# Patient Record
Sex: FEMALE | Race: WHITE | NOT HISPANIC OR LATINO | URBAN - METROPOLITAN AREA
[De-identification: names, ages, dates, MRNs, and addresses within clinical notes are randomized per-mention and may not be internally consistent; named-entity substitution may affect disease eponyms.]

---

## 2022-05-10 ENCOUNTER — EMERGENCY (EMERGENCY)
Facility: HOSPITAL | Age: 37
LOS: 1 days | Discharge: HOME | End: 2022-05-10
Attending: STUDENT IN AN ORGANIZED HEALTH CARE EDUCATION/TRAINING PROGRAM
Payer: COMMERCIAL

## 2022-05-10 VITALS
SYSTOLIC BLOOD PRESSURE: 127 MMHG | HEIGHT: 62 IN | DIASTOLIC BLOOD PRESSURE: 91 MMHG | TEMPERATURE: 99 F | HEART RATE: 99 BPM | RESPIRATION RATE: 18 BRPM | WEIGHT: 244.05 LBS | OXYGEN SATURATION: 96 %

## 2022-05-10 DIAGNOSIS — Z98.89 OTHER SPECIFIED POSTPROCEDURAL STATES: Chronic | ICD-10-CM

## 2022-05-10 LAB
ALBUMIN SERPL ELPH-MCNC: 3.7 G/DL — SIGNIFICANT CHANGE UP (ref 3.3–5.2)
ALP SERPL-CCNC: 81 U/L — SIGNIFICANT CHANGE UP (ref 40–120)
ALT FLD-CCNC: 37 U/L — HIGH
ANION GAP SERPL CALC-SCNC: 13 MMOL/L — SIGNIFICANT CHANGE UP (ref 5–17)
AST SERPL-CCNC: 25 U/L — SIGNIFICANT CHANGE UP
BASOPHILS # BLD AUTO: 0.04 K/UL — SIGNIFICANT CHANGE UP (ref 0–0.2)
BASOPHILS NFR BLD AUTO: 0.3 % — SIGNIFICANT CHANGE UP (ref 0–2)
BILIRUB SERPL-MCNC: 0.4 MG/DL — SIGNIFICANT CHANGE UP (ref 0.4–2)
BLD GP AB SCN SERPL QL: SIGNIFICANT CHANGE UP
BUN SERPL-MCNC: 8.5 MG/DL — SIGNIFICANT CHANGE UP (ref 8–20)
CALCIUM SERPL-MCNC: 8.8 MG/DL — SIGNIFICANT CHANGE UP (ref 8.6–10.2)
CHLORIDE SERPL-SCNC: 99 MMOL/L — SIGNIFICANT CHANGE UP (ref 98–107)
CO2 SERPL-SCNC: 22 MMOL/L — SIGNIFICANT CHANGE UP (ref 22–29)
CREAT SERPL-MCNC: 0.63 MG/DL — SIGNIFICANT CHANGE UP (ref 0.5–1.3)
EGFR: 119 ML/MIN/1.73M2 — SIGNIFICANT CHANGE UP
EOSINOPHIL # BLD AUTO: 0.09 K/UL — SIGNIFICANT CHANGE UP (ref 0–0.5)
EOSINOPHIL NFR BLD AUTO: 0.7 % — SIGNIFICANT CHANGE UP (ref 0–6)
GLUCOSE SERPL-MCNC: 104 MG/DL — HIGH (ref 70–99)
HCG SERPL-ACNC: 772.8 MIU/ML — HIGH
HCT VFR BLD CALC: 41.8 % — SIGNIFICANT CHANGE UP (ref 34.5–45)
HGB BLD-MCNC: 14 G/DL — SIGNIFICANT CHANGE UP (ref 11.5–15.5)
IMM GRANULOCYTES NFR BLD AUTO: 0.2 % — SIGNIFICANT CHANGE UP (ref 0–1.5)
LYMPHOCYTES # BLD AUTO: 3.89 K/UL — HIGH (ref 1–3.3)
LYMPHOCYTES # BLD AUTO: 30.5 % — SIGNIFICANT CHANGE UP (ref 13–44)
MCHC RBC-ENTMCNC: 30 PG — SIGNIFICANT CHANGE UP (ref 27–34)
MCHC RBC-ENTMCNC: 33.5 GM/DL — SIGNIFICANT CHANGE UP (ref 32–36)
MCV RBC AUTO: 89.5 FL — SIGNIFICANT CHANGE UP (ref 80–100)
MONOCYTES # BLD AUTO: 0.81 K/UL — SIGNIFICANT CHANGE UP (ref 0–0.9)
MONOCYTES NFR BLD AUTO: 6.4 % — SIGNIFICANT CHANGE UP (ref 2–14)
NEUTROPHILS # BLD AUTO: 7.89 K/UL — HIGH (ref 1.8–7.4)
NEUTROPHILS NFR BLD AUTO: 61.9 % — SIGNIFICANT CHANGE UP (ref 43–77)
PLATELET # BLD AUTO: 260 K/UL — SIGNIFICANT CHANGE UP (ref 150–400)
POTASSIUM SERPL-MCNC: 3.8 MMOL/L — SIGNIFICANT CHANGE UP (ref 3.5–5.3)
POTASSIUM SERPL-SCNC: 3.8 MMOL/L — SIGNIFICANT CHANGE UP (ref 3.5–5.3)
PROT SERPL-MCNC: 7.6 G/DL — SIGNIFICANT CHANGE UP (ref 6.6–8.7)
RBC # BLD: 4.67 M/UL — SIGNIFICANT CHANGE UP (ref 3.8–5.2)
RBC # FLD: 12.3 % — SIGNIFICANT CHANGE UP (ref 10.3–14.5)
SODIUM SERPL-SCNC: 134 MMOL/L — LOW (ref 135–145)
WBC # BLD: 12.74 K/UL — HIGH (ref 3.8–10.5)
WBC # FLD AUTO: 12.74 K/UL — HIGH (ref 3.8–10.5)

## 2022-05-10 PROCEDURE — 99284 EMERGENCY DEPT VISIT MOD MDM: CPT | Mod: 25

## 2022-05-10 PROCEDURE — 85025 COMPLETE CBC W/AUTO DIFF WBC: CPT

## 2022-05-10 PROCEDURE — 86850 RBC ANTIBODY SCREEN: CPT

## 2022-05-10 PROCEDURE — 80053 COMPREHEN METABOLIC PANEL: CPT

## 2022-05-10 PROCEDURE — 99285 EMERGENCY DEPT VISIT HI MDM: CPT

## 2022-05-10 PROCEDURE — 36415 COLL VENOUS BLD VENIPUNCTURE: CPT

## 2022-05-10 PROCEDURE — 76817 TRANSVAGINAL US OBSTETRIC: CPT

## 2022-05-10 PROCEDURE — 76801 OB US < 14 WKS SINGLE FETUS: CPT | Mod: 26

## 2022-05-10 PROCEDURE — 86901 BLOOD TYPING SEROLOGIC RH(D): CPT

## 2022-05-10 PROCEDURE — 76817 TRANSVAGINAL US OBSTETRIC: CPT | Mod: 26

## 2022-05-10 PROCEDURE — 76801 OB US < 14 WKS SINGLE FETUS: CPT

## 2022-05-10 PROCEDURE — 84702 CHORIONIC GONADOTROPIN TEST: CPT

## 2022-05-10 PROCEDURE — 86900 BLOOD TYPING SEROLOGIC ABO: CPT

## 2022-05-10 RX ORDER — ACETAMINOPHEN 500 MG
650 TABLET ORAL ONCE
Refills: 0 | Status: COMPLETED | OUTPATIENT
Start: 2022-05-10 | End: 2022-05-10

## 2022-05-10 RX ADMIN — Medication 650 MILLIGRAM(S): at 22:05

## 2022-05-10 NOTE — ED PROVIDER NOTE - OBJECTIVE STATEMENT
pt is a 35 y/o female  currently 6 weeks pregnant presenting to the ed for evaluation. pt was sent in by [facility]gyn [facility] for abdominal pain. pt states has hcg levels that are decreasing, abdominal pain since last night. pt denies vaginal bleeding. pt states had ultrasound in the office today and they were unable to visualize the left ovary. pt denies cp sob fever nausea vomiting headache neck pain vaginal discharge or bleeding dysuria hematuria

## 2022-05-10 NOTE — ED PROVIDER NOTE - PATIENT PORTAL LINK FT
You can access the FollowMyHealth Patient Portal offered by [facility] by registering at the following website: [email or web address]. By joining [facility]’s FollowMyHealth portal, you will also be able to view your health information using other applications (apps) compatible with our system.

## 2022-05-10 NOTE — ED PROVIDER NOTE - NS ED ATTENDING STATEMENT MOD
This was a shared visit with the SHABBIR. I reviewed and verified the documentation and independently performed the documented:

## 2022-05-10 NOTE — ED PROVIDER NOTE - NSFOLLOWUPINSTRUCTIONS_ED_ALL_ED_FT
[facility]                                                                                                                                                                                                          Abdominal Pain During Pregnancy      Abdominal pain is common during pregnancy and has many possible causes. Some causes are more serious than others, and sometimes the cause is not known.    Abdominal pain can be a sign that labor is starting. It can also be caused by normal growth of your baby causing stretching of muscles and ligaments during pregnancy. Always tell your health care provider if you have any abdominal pain.      Follow these instructions at home:     • Do not have sex or put anything in your vagina until your pain goes away completely.      •Get plenty of rest until your pain improves.      •Drink enough fluid to keep your urine pale yellow.      •Take over-the-counter and prescription medicines only as told by your health care provider.      •Keep all follow-up visits. This is important.        Contact a health care provider if:    •Your pain continues or gets worse after resting.    •You have lower abdominal pain that:  •Comes and goes at regular intervals.      •Spreads to your back.      •Is similar to menstrual cramps.        •You have pain or burning when you urinate.        Get help right away if:    •You have a fever, chills, or shortness of breath.      •You have vaginal bleeding.      •You are leaking fluid or passing tissue from your vagina.      •You have vomiting or diarrhea that lasts for more than 24 hours.      •Your baby is moving less than usual.      •You feel very weak or faint.      •You develop severe pain in your upper abdomen.        Summary    •Abdominal pain is common during pregnancy and has many possible causes.      •If you experience abdominal pain during pregnancy, tell your health care provider right away.      •Follow your health care provider's home care instructions and keep all follow-up visits as told.      This information is not intended to replace advice given to you by your health care provider. Make sure you discuss any questions you have with your health care provider.      Document Revised: 08/31/2021 Document Reviewed: 08/31/2021    Elsevier Patient Education © 2022 [employer]

## 2022-05-10 NOTE — ED ADULT TRIAGE NOTE - CHIEF COMPLAINT QUOTE
Ambulatory  sent in by OBGYN to r/o ectopic pregnancy. Patient presents with LLQ abdominal pain without vaginal bleeding/discharge with decreasing serial HCG's. Pain started last night and has been worsening. Has not medicated for pain.

## 2022-05-23 ENCOUNTER — APPOINTMENT (OUTPATIENT)
Dept: OBGYN | Facility: CLINIC | Age: 37
End: 2022-05-23

## 2022-07-18 ENCOUNTER — OUTPATIENT (OUTPATIENT)
Dept: OUTPATIENT SERVICES | Facility: HOSPITAL | Age: 37
LOS: 1 days | End: 2022-07-18
Payer: COMMERCIAL

## 2022-07-18 VITALS
HEART RATE: 100 BPM | DIASTOLIC BLOOD PRESSURE: 89 MMHG | SYSTOLIC BLOOD PRESSURE: 131 MMHG | HEIGHT: 62 IN | OXYGEN SATURATION: 99 % | WEIGHT: 238.98 LBS | TEMPERATURE: 98 F | RESPIRATION RATE: 17 BRPM

## 2022-07-18 DIAGNOSIS — Z98.89 OTHER SPECIFIED POSTPROCEDURAL STATES: Chronic | ICD-10-CM

## 2022-07-18 DIAGNOSIS — N85.00 ENDOMETRIAL HYPERPLASIA, UNSPECIFIED: ICD-10-CM

## 2022-07-18 DIAGNOSIS — Z01.818 ENCOUNTER FOR OTHER PREPROCEDURAL EXAMINATION: ICD-10-CM

## 2022-07-18 LAB
ALBUMIN SERPL ELPH-MCNC: 3.4 G/DL — SIGNIFICANT CHANGE UP (ref 3.3–5)
ALP SERPL-CCNC: 96 U/L — SIGNIFICANT CHANGE UP (ref 40–120)
ALT FLD-CCNC: 35 U/L — SIGNIFICANT CHANGE UP (ref 12–78)
ANION GAP SERPL CALC-SCNC: 4 MMOL/L — LOW (ref 5–17)
AST SERPL-CCNC: 18 U/L — SIGNIFICANT CHANGE UP (ref 15–37)
BILIRUB SERPL-MCNC: 0.4 MG/DL — SIGNIFICANT CHANGE UP (ref 0.2–1.2)
BUN SERPL-MCNC: 13 MG/DL — SIGNIFICANT CHANGE UP (ref 7–23)
CALCIUM SERPL-MCNC: 9.2 MG/DL — SIGNIFICANT CHANGE UP (ref 8.5–10.1)
CHLORIDE SERPL-SCNC: 105 MMOL/L — SIGNIFICANT CHANGE UP (ref 96–108)
CO2 SERPL-SCNC: 29 MMOL/L — SIGNIFICANT CHANGE UP (ref 22–31)
CREAT SERPL-MCNC: 0.88 MG/DL — SIGNIFICANT CHANGE UP (ref 0.5–1.3)
EGFR: 88 ML/MIN/1.73M2 — SIGNIFICANT CHANGE UP
GLUCOSE SERPL-MCNC: 96 MG/DL — SIGNIFICANT CHANGE UP (ref 70–99)
HCG SERPL-ACNC: <1 MIU/ML — SIGNIFICANT CHANGE UP
HCT VFR BLD CALC: 42.2 % — SIGNIFICANT CHANGE UP (ref 34.5–45)
HGB BLD-MCNC: 14.2 G/DL — SIGNIFICANT CHANGE UP (ref 11.5–15.5)
MCHC RBC-ENTMCNC: 29.8 PG — SIGNIFICANT CHANGE UP (ref 27–34)
MCHC RBC-ENTMCNC: 33.6 GM/DL — SIGNIFICANT CHANGE UP (ref 32–36)
MCV RBC AUTO: 88.7 FL — SIGNIFICANT CHANGE UP (ref 80–100)
NRBC # BLD: 0 /100 WBCS — SIGNIFICANT CHANGE UP (ref 0–0)
PLATELET # BLD AUTO: 262 K/UL — SIGNIFICANT CHANGE UP (ref 150–400)
POTASSIUM SERPL-MCNC: 4.4 MMOL/L — SIGNIFICANT CHANGE UP (ref 3.5–5.3)
POTASSIUM SERPL-SCNC: 4.4 MMOL/L — SIGNIFICANT CHANGE UP (ref 3.5–5.3)
PROT SERPL-MCNC: 7.9 G/DL — SIGNIFICANT CHANGE UP (ref 6–8.3)
RBC # BLD: 4.76 M/UL — SIGNIFICANT CHANGE UP (ref 3.8–5.2)
RBC # FLD: 12.1 % — SIGNIFICANT CHANGE UP (ref 10.3–14.5)
SARS-COV-2 RNA SPEC QL NAA+PROBE: SIGNIFICANT CHANGE UP
SODIUM SERPL-SCNC: 138 MMOL/L — SIGNIFICANT CHANGE UP (ref 135–145)
WBC # BLD: 10.72 K/UL — HIGH (ref 3.8–10.5)
WBC # FLD AUTO: 10.72 K/UL — HIGH (ref 3.8–10.5)

## 2022-07-18 PROCEDURE — 36415 COLL VENOUS BLD VENIPUNCTURE: CPT

## 2022-07-18 PROCEDURE — U0005: CPT

## 2022-07-18 PROCEDURE — U0003: CPT

## 2022-07-18 PROCEDURE — 93005 ELECTROCARDIOGRAM TRACING: CPT

## 2022-07-18 PROCEDURE — 86901 BLOOD TYPING SEROLOGIC RH(D): CPT

## 2022-07-18 PROCEDURE — 93010 ELECTROCARDIOGRAM REPORT: CPT

## 2022-07-18 PROCEDURE — 86900 BLOOD TYPING SEROLOGIC ABO: CPT

## 2022-07-18 PROCEDURE — 84702 CHORIONIC GONADOTROPIN TEST: CPT

## 2022-07-18 PROCEDURE — 85027 COMPLETE CBC AUTOMATED: CPT

## 2022-07-18 PROCEDURE — 80053 COMPREHEN METABOLIC PANEL: CPT

## 2022-07-18 PROCEDURE — 86850 RBC ANTIBODY SCREEN: CPT

## 2022-07-18 PROCEDURE — G0463: CPT

## 2022-07-18 NOTE — H&P PST ADULT - ASSESSMENT
This 35 yo f with a pMHX PCOS, , thyroid disease, hx of benign brain tumor  presents to PST for a scheduled D&C hysteroscopy with Myosure  with Dr Landers  on 7/21/22 .

## 2022-07-18 NOTE — H&P PST ADULT - HISTORY OF PRESENT ILLNESS
This 37 yo f with a pMHX PCOS, , thyroid disease, hx of benign brain tumor  presents to PST for a scheduled D&C hysteroscopy with Myosure  with Dr Landers  on 7/21/22 . Pt is electing to have this procedure.  This 37 yo f with a pMHX PCOS, , thyroid disease, hx of benign brain tumor  presents to PST for a scheduled D&C hysteroscopy with Myosure  with Dr Landers  on 7/21/22 . Pt is electing to have this procedure. Pt had a miscarriage in April. She has had consistent menstrual bleeding with cramps since. She does not go through more than on pad per day. She denies any chest pain, palpitations, SOB,

## 2022-07-18 NOTE — H&P PST ADULT - NS PRO FEM REPRO BREAST EXAM FREQ
Case Management Discharge Note      Final Note: Per [facility] can accept pt to skilled rehab/LTC with on site hospice services. Pt's sister (Claudette Reza, [phone]) updated. Jelani w/c carlo arranged at 5:00 PM. Packet provided to RNDarcy Paz LCSW         Selected Continued Care - Admitted Since 2/15/2022     Destination Coordination complete.    Service Provider Selected Services Address Phone Fax Patient Preferred    [facility] [address] [id number] [phone] --          Durable Medical Equipment    No services have been selected for the patient.              Dialysis/Infusion    No services have been selected for the patient.              Home Medical Care    No services have been selected for the patient.              Therapy    No services have been selected for the patient.              Community Resources    No services have been selected for the patient.              Community & DME    No services have been selected for the patient.                  Transportation Services  [facility]    Final Discharge Disposition Code: 03 - skilled nursing facility (SNF)   monthly

## 2022-07-18 NOTE — H&P PST ADULT - NSICDXFAMILYHX_GEN_ALL_CORE_FT
FAMILY HISTORY:  Mother  Still living? Unknown  FH: diabetes mellitus, Age at diagnosis: Age Unknown  FH: thyroid disease, Age at diagnosis: Age Unknown    Aunt  Still living? No  FH: breast cancer, Age at diagnosis: Age Unknown

## 2022-07-18 NOTE — H&P PST ADULT - PROBLEM SELECTOR PLAN 1
PST: CBC, HCG, T&S   No medical evaluation needed  All preoperative instructions including CHD cleanse reviewed with patient who verbalized understanding.   Avoid all NSAID/ASA containing products as well as all herbal/vitamin supplements. Tylenol only for pain/headache.   Covid swab at [place] date TBD. Pt verbalized understanding. Fully vaccinated; Denies recent travel, recent illness and sick contact with COVID in the last 3 weeks

## 2022-07-20 ENCOUNTER — TRANSCRIPTION ENCOUNTER (OUTPATIENT)
Age: 37
End: 2022-07-20

## 2022-07-20 NOTE — ASU PATIENT PROFILE, ADULT - DOES PATIENT HAVE ADVANCE DIRECTIVE
Detail Level: Detailed Quality 110: Preventive Care And Screening: Influenza Immunization: Influenza Immunization Administered during Influenza season Quality 431: Preventive Care And Screening: Unhealthy Alcohol Use - Screening: Patient screened for unhealthy alcohol use using a single question and scores less than 2 times per year Quality 111:Pneumonia Vaccination Status For Older Adults: Pneumococcal Vaccination Previously Received Quality 226: Preventive Care And Screening: Tobacco Use: Screening And Cessation Intervention: Patient screened for tobacco use and is an ex/non-smoker Quality 130: Documentation Of Current Medications In The Medical Record: Current Medications Documented No

## 2022-07-20 NOTE — ASU PATIENT PROFILE, ADULT - NS PRO PT RIGHT SUPPORT PERSON
TO BE COMPLETED BY CENTRAL AUTH TEAM:     Physician: DR Gonzalo Ojeda    Medication:  SYNVISC-ONE     Number of Injections in Series (Appointments scheduled 1 week apart from one another):  1    Schedule after this date: 12/5/18    Billing Info: Buy and Bill/Specialty Pharmacy-Patient Supply BUY & BILL     Appointment Message Line:   RIGHT KNEE SYNVISC-ONE ([address])    (please copy and paste appointment message line when scheduling appointment)    Additional Comments: LEFT MSG same name as above

## 2022-07-20 NOTE — ASU PATIENT PROFILE, ADULT - NSICDXPASTMEDICALHX_GEN_ALL_CORE_FT
PAST MEDICAL HISTORY:  Brain tumor benign    Depression     Seizure last seizure over 15 yrs ago.

## 2022-07-21 ENCOUNTER — OUTPATIENT (OUTPATIENT)
Dept: OUTPATIENT SERVICES | Facility: HOSPITAL | Age: 37
LOS: 1 days | End: 2022-07-21
Payer: COMMERCIAL

## 2022-07-21 ENCOUNTER — TRANSCRIPTION ENCOUNTER (OUTPATIENT)
Age: 37
End: 2022-07-21

## 2022-07-21 ENCOUNTER — RESULT REVIEW (OUTPATIENT)
Age: 37
End: 2022-07-21

## 2022-07-21 VITALS
HEART RATE: 83 BPM | OXYGEN SATURATION: 97 % | SYSTOLIC BLOOD PRESSURE: 124 MMHG | DIASTOLIC BLOOD PRESSURE: 80 MMHG | TEMPERATURE: 98 F | HEIGHT: 62 IN | WEIGHT: 238.98 LBS | RESPIRATION RATE: 14 BRPM

## 2022-07-21 VITALS
DIASTOLIC BLOOD PRESSURE: 69 MMHG | OXYGEN SATURATION: 100 % | SYSTOLIC BLOOD PRESSURE: 119 MMHG | RESPIRATION RATE: 13 BRPM

## 2022-07-21 DIAGNOSIS — N85.00 ENDOMETRIAL HYPERPLASIA, UNSPECIFIED: ICD-10-CM

## 2022-07-21 DIAGNOSIS — Z98.89 OTHER SPECIFIED POSTPROCEDURAL STATES: Chronic | ICD-10-CM

## 2022-07-21 DIAGNOSIS — Z01.818 ENCOUNTER FOR OTHER PREPROCEDURAL EXAMINATION: ICD-10-CM

## 2022-07-21 PROCEDURE — 88305 TISSUE EXAM BY PATHOLOGIST: CPT

## 2022-07-21 PROCEDURE — 58558 HYSTEROSCOPY BIOPSY: CPT

## 2022-07-21 PROCEDURE — 88305 TISSUE EXAM BY PATHOLOGIST: CPT | Mod: 26

## 2022-07-21 DEVICE — MYOSURE TISSUE REMOVAL DEVICE REACH: Type: IMPLANTABLE DEVICE | Status: FUNCTIONAL

## 2022-07-21 DEVICE — MYOSURE TISSUE REMOVAL DEVICE LITE: Type: IMPLANTABLE DEVICE | Status: FUNCTIONAL

## 2022-07-21 DEVICE — MYOSURE TISSUE REMOVAL FMS FOR FLUENT XL: Type: IMPLANTABLE DEVICE | Status: FUNCTIONAL

## 2022-07-21 RX ORDER — SODIUM CHLORIDE 9 MG/ML
1000 INJECTION, SOLUTION INTRAVENOUS
Refills: 0 | Status: DISCONTINUED | OUTPATIENT
Start: 2022-07-21 | End: 2022-07-21

## 2022-07-21 RX ORDER — CHOLECALCIFEROL (VITAMIN D3) 125 MCG
0 CAPSULE ORAL
Qty: 0 | Refills: 0 | DISCHARGE

## 2022-07-21 RX ORDER — METOCLOPRAMIDE HCL 10 MG
5 TABLET ORAL ONCE
Refills: 0 | Status: DISCONTINUED | OUTPATIENT
Start: 2022-07-21 | End: 2022-07-21

## 2022-07-21 RX ORDER — OXYCODONE HYDROCHLORIDE 5 MG/1
5 TABLET ORAL ONCE
Refills: 0 | Status: DISCONTINUED | OUTPATIENT
Start: 2022-07-21 | End: 2022-07-21

## 2022-07-21 RX ORDER — HYDROMORPHONE HYDROCHLORIDE 2 MG/ML
0.5 INJECTION INTRAMUSCULAR; INTRAVENOUS; SUBCUTANEOUS
Refills: 0 | Status: DISCONTINUED | OUTPATIENT
Start: 2022-07-21 | End: 2022-07-21

## 2022-07-21 RX ORDER — METFORMIN HYDROCHLORIDE 850 MG/1
0 TABLET ORAL
Qty: 0 | Refills: 0 | DISCHARGE

## 2022-07-21 RX ADMIN — SODIUM CHLORIDE 60 MILLILITER(S): 9 INJECTION, SOLUTION INTRAVENOUS at 08:34

## 2022-07-21 NOTE — BRIEF OPERATIVE NOTE - OPERATION/FINDINGS
anteverted uterus, normal size; cervix L/C/P  uterus sounding to 3 cm  no adnexal masses, vulva and vagina wnl  the endometrium was smooth and regular, no polyps or fibroids were noted. anteverted uterus, normal size; cervix L/C/P  uterus sounding to 3 cm  no adnexal masses, vulva and vagina wnl  the endometrium was smooth and regular, no polyps or fibroids were noted.  job # [id number]

## 2022-07-21 NOTE — ASU DISCHARGE PLAN (ADULT/PEDIATRIC) - CARE PROVIDER_API CALL
Elvie Landers)  Obstetrics and Gynecology  [address] [place] [address]  Phone: [phone]  Fax: [phone]  Established Patient  Follow Up Time: 1 week

## 2022-07-22 LAB — SURGICAL PATHOLOGY STUDY: SIGNIFICANT CHANGE UP

## 2023-03-20 ENCOUNTER — ASOB RESULT (OUTPATIENT)
Age: 38
End: 2023-03-20

## 2023-03-20 ENCOUNTER — APPOINTMENT (OUTPATIENT)
Dept: ANTEPARTUM | Facility: CLINIC | Age: 38
End: 2023-03-20
Payer: COMMERCIAL

## 2023-03-20 DIAGNOSIS — O35.1XX0 MATERNAL CARE FOR (SUSPECTED) CHROMOSOMAL ABNORMALITY IN FETUS, NOT APPLICABLE OR UNSPECIFIED: ICD-10-CM

## 2023-03-20 DIAGNOSIS — N91.2 AMENORRHEA, UNSPECIFIED: ICD-10-CM

## 2023-03-20 PROCEDURE — 36415 COLL VENOUS BLD VENIPUNCTURE: CPT

## 2023-03-20 PROCEDURE — 76813 OB US NUCHAL MEAS 1 GEST: CPT

## 2023-03-29 LAB
ADDITIONAL US: NORMAL
COMMENTS: AFP: NORMAL
CRL SCAN TWIN B: NORMAL
CRL SCAN: NORMAL
CROWN RUMP LENGTH TWIN B: NORMAL
CROWN RUMP LENGTH: 64.3 MM
DOWN SYNDROME AGE RISK: NORMAL
DOWN SYNDROME INTERPRETATION: NORMAL
DOWN SYNDROME SCREENING RISK: NORMAL
GEST. AGE ON COLLECTION DATE: 12.6 WEEKS
HCG MOM: 0.81
HCG VALUE: 59.3 IU/ML
MATERNAL AGE AT EDD: 36.8 YR
NOTE: AFP: NORMAL
NT MOM TWIN B: NORMAL
NT TWIN B: NORMAL
NUCHAL TRANSLUCENCY (NT): 2.1 MM
NUCHAL TRANSLUCENCY MOM: 1.27
NUMBER OF FETUSES: 1
PAPP-A MOM: 1.08
PAPP-A VALUE: 625.8 NG/ML
RACE: NORMAL
RESULTS AFP: NORMAL
SONOGRAPHER ID#: NORMAL
SUBMIT PART 2 SAMPLE USING: NORMAL
TEST RESULTS: AFP: NORMAL
TRISOMY 18 AGE RISK: NORMAL
TRISOMY 18 INTERPRETATION: NORMAL
TRISOMY 18 SCREENING RISK: NORMAL
WEIGHT AFP: 241 LBS

## 2023-04-07 NOTE — HISTORY OF PRESENT ILLNESS
[FreeTextEntry1] : Ms. CARBALLO  is a 37 year old  female  who presents for initial endocrine evaluation. She presents with regard to a history of . She presents via the kind courtesy of   . \par \par  Additional medical history includes that of seizure disorder (first seizure at age 9and was dx with left parietal tumor s/p resection)\par surgical hx : craniotomy grid implantation July 2008 and another resection May 2009 for residual mass and scar tissue \par

## 2023-04-12 ENCOUNTER — APPOINTMENT (OUTPATIENT)
Dept: ENDOCRINOLOGY | Facility: CLINIC | Age: 38
End: 2023-04-12

## 2023-05-22 ENCOUNTER — APPOINTMENT (OUTPATIENT)
Dept: ANTEPARTUM | Facility: CLINIC | Age: 38
End: 2023-05-22
Payer: COMMERCIAL

## 2023-05-22 ENCOUNTER — ASOB RESULT (OUTPATIENT)
Age: 38
End: 2023-05-22

## 2023-05-22 PROCEDURE — 76811 OB US DETAILED SNGL FETUS: CPT

## 2023-05-30 ENCOUNTER — ASOB RESULT (OUTPATIENT)
Age: 38
End: 2023-05-30

## 2023-05-30 ENCOUNTER — APPOINTMENT (OUTPATIENT)
Dept: ANTEPARTUM | Facility: CLINIC | Age: 38
End: 2023-05-30
Payer: COMMERCIAL

## 2023-05-30 PROCEDURE — 76816 OB US FOLLOW-UP PER FETUS: CPT

## 2023-08-21 ENCOUNTER — TRANSCRIPTION ENCOUNTER (OUTPATIENT)
Age: 38
End: 2023-08-21

## 2023-08-21 ENCOUNTER — INPATIENT (INPATIENT)
Facility: HOSPITAL | Age: 38
LOS: 3 days | Discharge: HOME | End: 2023-08-25
Payer: COMMERCIAL

## 2023-08-21 VITALS — SYSTOLIC BLOOD PRESSURE: 142 MMHG | HEART RATE: 90 BPM | OXYGEN SATURATION: 100 % | DIASTOLIC BLOOD PRESSURE: 93 MMHG

## 2023-08-21 DIAGNOSIS — O26.899 OTHER SPECIFIED PREGNANCY RELATED CONDITIONS, UNSPECIFIED TRIMESTER: ICD-10-CM

## 2023-08-21 DIAGNOSIS — Z98.89 OTHER SPECIFIED POSTPROCEDURAL STATES: Chronic | ICD-10-CM

## 2023-08-21 DIAGNOSIS — Z34.80 ENCOUNTER FOR SUPERVISION OF OTHER NORMAL PREGNANCY, UNSPECIFIED TRIMESTER: ICD-10-CM

## 2023-08-21 LAB
ALBUMIN SERPL ELPH-MCNC: 2.9 G/DL — LOW (ref 3.3–5)
ALP SERPL-CCNC: 220 U/L — HIGH (ref 40–120)
ALT FLD-CCNC: 20 U/L — SIGNIFICANT CHANGE UP (ref 10–45)
ANION GAP SERPL CALC-SCNC: 11 MMOL/L — SIGNIFICANT CHANGE UP (ref 5–17)
APTT BLD: 32.8 SEC — SIGNIFICANT CHANGE UP (ref 24.5–35.6)
AST SERPL-CCNC: 17 U/L — SIGNIFICANT CHANGE UP (ref 10–40)
BASOPHILS # BLD AUTO: 0.04 K/UL — SIGNIFICANT CHANGE UP (ref 0–0.2)
BASOPHILS NFR BLD AUTO: 0.3 % — SIGNIFICANT CHANGE UP (ref 0–2)
BILIRUB SERPL-MCNC: 0.2 MG/DL — SIGNIFICANT CHANGE UP (ref 0.2–1.2)
BLD GP AB SCN SERPL QL: NEGATIVE — SIGNIFICANT CHANGE UP
BUN SERPL-MCNC: 12 MG/DL — SIGNIFICANT CHANGE UP (ref 7–23)
CALCIUM SERPL-MCNC: 9.3 MG/DL — SIGNIFICANT CHANGE UP (ref 8.4–10.5)
CHLORIDE SERPL-SCNC: 104 MMOL/L — SIGNIFICANT CHANGE UP (ref 96–108)
CO2 SERPL-SCNC: 18 MMOL/L — LOW (ref 22–31)
CREAT SERPL-MCNC: 1 MG/DL — SIGNIFICANT CHANGE UP (ref 0.5–1.3)
EGFR: 75 ML/MIN/1.73M2 — SIGNIFICANT CHANGE UP
EOSINOPHIL # BLD AUTO: 0.12 K/UL — SIGNIFICANT CHANGE UP (ref 0–0.5)
EOSINOPHIL NFR BLD AUTO: 1 % — SIGNIFICANT CHANGE UP (ref 0–6)
FIBRINOGEN PPP-MCNC: 466 MG/DL — HIGH (ref 200–445)
GLUCOSE SERPL-MCNC: 131 MG/DL — HIGH (ref 70–99)
HCT VFR BLD CALC: 36.3 % — SIGNIFICANT CHANGE UP (ref 34.5–45)
HGB BLD-MCNC: 12.6 G/DL — SIGNIFICANT CHANGE UP (ref 11.5–15.5)
IMM GRANULOCYTES NFR BLD AUTO: 0.3 % — SIGNIFICANT CHANGE UP (ref 0–0.9)
INR BLD: 0.87 RATIO — SIGNIFICANT CHANGE UP (ref 0.85–1.18)
LDH SERPL L TO P-CCNC: 196 U/L — SIGNIFICANT CHANGE UP (ref 50–242)
LYMPHOCYTES # BLD AUTO: 2.95 K/UL — SIGNIFICANT CHANGE UP (ref 1–3.3)
LYMPHOCYTES # BLD AUTO: 25.2 % — SIGNIFICANT CHANGE UP (ref 13–44)
MCHC RBC-ENTMCNC: 30 PG — SIGNIFICANT CHANGE UP (ref 27–34)
MCHC RBC-ENTMCNC: 34.7 GM/DL — SIGNIFICANT CHANGE UP (ref 32–36)
MCV RBC AUTO: 86.4 FL — SIGNIFICANT CHANGE UP (ref 80–100)
MONOCYTES # BLD AUTO: 0.84 K/UL — SIGNIFICANT CHANGE UP (ref 0–0.9)
MONOCYTES NFR BLD AUTO: 7.2 % — SIGNIFICANT CHANGE UP (ref 2–14)
NEUTROPHILS # BLD AUTO: 7.72 K/UL — HIGH (ref 1.8–7.4)
NEUTROPHILS NFR BLD AUTO: 66 % — SIGNIFICANT CHANGE UP (ref 43–77)
NRBC # BLD: 0 /100 WBCS — SIGNIFICANT CHANGE UP (ref 0–0)
PLATELET # BLD AUTO: 171 K/UL — SIGNIFICANT CHANGE UP (ref 150–400)
POTASSIUM SERPL-MCNC: 4.1 MMOL/L — SIGNIFICANT CHANGE UP (ref 3.5–5.3)
POTASSIUM SERPL-SCNC: 4.1 MMOL/L — SIGNIFICANT CHANGE UP (ref 3.5–5.3)
PROT SERPL-MCNC: 6.1 G/DL — SIGNIFICANT CHANGE UP (ref 6–8.3)
PROTHROM AB SERPL-ACNC: 9.6 SEC — SIGNIFICANT CHANGE UP (ref 9.5–13)
RBC # BLD: 4.2 M/UL — SIGNIFICANT CHANGE UP (ref 3.8–5.2)
RBC # FLD: 12.7 % — SIGNIFICANT CHANGE UP (ref 10.3–14.5)
RH IG SCN BLD-IMP: POSITIVE — SIGNIFICANT CHANGE UP
RH IG SCN BLD-IMP: POSITIVE — SIGNIFICANT CHANGE UP
SODIUM SERPL-SCNC: 133 MMOL/L — LOW (ref 135–145)
URATE SERPL-MCNC: 6.8 MG/DL — SIGNIFICANT CHANGE UP (ref 2.5–7)
WBC # BLD: 11.71 K/UL — HIGH (ref 3.8–10.5)
WBC # FLD AUTO: 11.71 K/UL — HIGH (ref 3.8–10.5)

## 2023-08-21 RX ORDER — MAGNESIUM SULFATE 500 MG/ML
4 VIAL (ML) INJECTION ONCE
Refills: 0 | Status: COMPLETED | OUTPATIENT
Start: 2023-08-21 | End: 2023-08-21

## 2023-08-21 RX ORDER — MAGNESIUM SULFATE 500 MG/ML
2 VIAL (ML) INJECTION
Qty: 40 | Refills: 0 | Status: DISCONTINUED | OUTPATIENT
Start: 2023-08-21 | End: 2023-08-22

## 2023-08-21 RX ORDER — OXYTOCIN 10 UNIT/ML
333.33 VIAL (ML) INJECTION
Qty: 20 | Refills: 0 | Status: DISCONTINUED | OUTPATIENT
Start: 2023-08-21 | End: 2023-08-25

## 2023-08-21 RX ORDER — NIFEDIPINE 30 MG
30 TABLET, EXTENDED RELEASE 24 HR ORAL EVERY 24 HOURS
Refills: 0 | Status: DISCONTINUED | OUTPATIENT
Start: 2023-08-21 | End: 2023-08-24

## 2023-08-21 RX ORDER — NIFEDIPINE 30 MG
10 TABLET, EXTENDED RELEASE 24 HR ORAL ONCE
Refills: 0 | Status: COMPLETED | OUTPATIENT
Start: 2023-08-21 | End: 2023-08-21

## 2023-08-21 RX ORDER — SODIUM CHLORIDE 9 MG/ML
1000 INJECTION, SOLUTION INTRAVENOUS ONCE
Refills: 0 | Status: DISCONTINUED | OUTPATIENT
Start: 2023-08-21 | End: 2023-08-22

## 2023-08-21 RX ORDER — CITRIC ACID/SODIUM CITRATE 300-500 MG
30 SOLUTION, ORAL ORAL ONCE
Refills: 0 | Status: DISCONTINUED | OUTPATIENT
Start: 2023-08-21 | End: 2023-08-22

## 2023-08-21 RX ORDER — NIFEDIPINE 30 MG
30 TABLET, EXTENDED RELEASE 24 HR ORAL ONCE
Refills: 0 | Status: DISCONTINUED | OUTPATIENT
Start: 2023-08-21 | End: 2023-08-21

## 2023-08-21 RX ORDER — SODIUM CHLORIDE 9 MG/ML
1000 INJECTION, SOLUTION INTRAVENOUS
Refills: 0 | Status: DISCONTINUED | OUTPATIENT
Start: 2023-08-21 | End: 2023-08-22

## 2023-08-21 RX ORDER — FAMOTIDINE 10 MG/ML
20 INJECTION INTRAVENOUS ONCE
Refills: 0 | Status: DISCONTINUED | OUTPATIENT
Start: 2023-08-21 | End: 2023-08-22

## 2023-08-21 RX ADMIN — Medication 10 MILLIGRAM(S): at 23:56

## 2023-08-21 RX ADMIN — Medication 12 MILLIGRAM(S): at 23:12

## 2023-08-21 RX ADMIN — Medication 300 GRAM(S): at 23:40

## 2023-08-21 RX ADMIN — Medication 30 MILLIGRAM(S): at 23:52

## 2023-08-21 NOTE — OB PROVIDER H&P - HISTORY OF PRESENT ILLNESS
R3 H&P    Pt is a 36y/o  at 34+1 admitted for sPEC as well as decreased FM. In triage patient was noted to have multiple severe range blood pressures requiring a push of procardia. Patient was noted to have a nonreactive NST. Patient complained of HA and decreased FM. Denied LOF, VB, Cx.  Prenatal cours c/b gHTN one month ago  EFW 3000    OBHx: SAB x1 s/p D+C  GynHx: Denies cysts, fibroids, abnromal paps, STIs  PMHx: pregestational, prediabetes  PSHx: SAB x1, Brain surgery x4  Med: Brain Tumor  All: NKDA  SH: denies toxic habits x3

## 2023-08-21 NOTE — OB PROVIDER H&P - ASSESSMENT
A&P: 37 year old  @ 34+1 presents with decreased FM and elevated in BP. In triage patient found to have multiple severe range blood pressures meeting criteria for pre-eclampsia with severe features. Of ntoe patient noted to be transverse  Labor: admit to L&D  -procardia 10 IR, procardia 30xL  -BPP 6/10  -routine labs  -EFM/toco  -NPO, IV hydration  Fetal: cat 1 tracing, fetal status reassuring  GBS: neg  Analgesia: Spinal    discussed w Dr Anshul Morales PGY3

## 2023-08-21 NOTE — OB RN PATIENT PROFILE - FALL HARM RISK - UNIVERSAL INTERVENTIONS
Bed in lowest position, wheels locked, appropriate side rails in place/Call bell, personal items and telephone in reach/Instruct patient to call for assistance before getting out of bed or chair/Non-slip footwear when patient is out of bed/[place] to call system/Physically safe environment - no spills, clutter or unnecessary equipment/Purposeful Proactive Rounding/Room/bathroom lighting operational, light cord in reach

## 2023-08-21 NOTE — OB RN TRIAGE NOTE - FALL HARM RISK - UNIVERSAL INTERVENTIONS
Bed in lowest position, wheels locked, appropriate side rails in place/Call bell, personal items and telephone in reach/Instruct patient to call for assistance before getting out of bed or chair/Non-slip footwear when patient is out of bed/[place] to call system/Physically safe environment - no spills, clutter or unnecessary equipment/Purposeful Proactive Rounding/Room/bathroom lighting operational, light cord in reach

## 2023-08-21 NOTE — OB RN PATIENT PROFILE - PROVIDER NOTIFICATION
Order for orthoses and diabetic shoes completed.  I apologize for the delay.  Please notify Lucille on Monday and confirm she has the information for setting an appointment of with Orthotics.     Thank you.     Dr. Nolasco   Declines

## 2023-08-21 NOTE — OB RN PATIENT PROFILE - GRAVIDA, OB PROFILE
Detail Level: Generalized
Detail Level: Simple
2
Moisturizer Recommendations: Amlactin
Detail Level: Zone

## 2023-08-22 LAB
APPEARANCE UR: CLEAR — SIGNIFICANT CHANGE UP
BACTERIA # UR AUTO: NEGATIVE — SIGNIFICANT CHANGE UP
BILIRUB UR-MCNC: NEGATIVE — SIGNIFICANT CHANGE UP
COLOR SPEC: YELLOW — SIGNIFICANT CHANGE UP
COVID-19 SPIKE DOMAIN AB INTERP: POSITIVE
COVID-19 SPIKE DOMAIN ANTIBODY RESULT: >250 U/ML — HIGH
CREAT ?TM UR-MCNC: 64 MG/DL — SIGNIFICANT CHANGE UP
DIFF PNL FLD: NEGATIVE — SIGNIFICANT CHANGE UP
EPI CELLS # UR: 5 — SIGNIFICANT CHANGE UP
GLUCOSE UR QL: NEGATIVE — SIGNIFICANT CHANGE UP
HYALINE CASTS # UR AUTO: 0 /LPF — SIGNIFICANT CHANGE UP (ref 0–7)
KETONES UR-MCNC: NEGATIVE — SIGNIFICANT CHANGE UP
LEUKOCYTE ESTERASE UR-ACNC: NEGATIVE — SIGNIFICANT CHANGE UP
MAGNESIUM SERPL-MCNC: 4.9 MG/DL — HIGH (ref 1.6–2.6)
MAGNESIUM SERPL-MCNC: 6.2 MG/DL — HIGH (ref 1.6–2.6)
MAGNESIUM SERPL-MCNC: 7 MG/DL — HIGH (ref 1.6–2.6)
NITRITE UR-MCNC: NEGATIVE — SIGNIFICANT CHANGE UP
PH UR: 6 — SIGNIFICANT CHANGE UP (ref 5–8)
PROT ?TM UR-MCNC: 30 MG/DL — HIGH (ref 0–12)
PROT UR-MCNC: ABNORMAL
PROT/CREAT UR-RTO: 0.5 RATIO — HIGH (ref 0–0.2)
RBC CASTS # UR COMP ASSIST: 2 /HPF — SIGNIFICANT CHANGE UP (ref 0–4)
SARS-COV-2 IGG+IGM SERPL QL IA: >250 U/ML — HIGH
SARS-COV-2 IGG+IGM SERPL QL IA: POSITIVE
SP GR SPEC: 1.01 — SIGNIFICANT CHANGE UP (ref 1.01–1.02)
T PALLIDUM AB TITR SER: NEGATIVE — SIGNIFICANT CHANGE UP
UROBILINOGEN FLD QL: SIGNIFICANT CHANGE UP
WBC UR QL: 2 /HPF — SIGNIFICANT CHANGE UP (ref 0–5)

## 2023-08-22 PROCEDURE — 88307 TISSUE EXAM BY PATHOLOGIST: CPT | Mod: 26

## 2023-08-22 RX ORDER — OXYCODONE HYDROCHLORIDE 5 MG/1
5 TABLET ORAL ONCE
Refills: 0 | Status: DISCONTINUED | OUTPATIENT
Start: 2023-08-22 | End: 2023-08-25

## 2023-08-22 RX ORDER — SIMETHICONE 80 MG/1
80 TABLET, CHEWABLE ORAL EVERY 4 HOURS
Refills: 0 | Status: DISCONTINUED | OUTPATIENT
Start: 2023-08-22 | End: 2023-08-25

## 2023-08-22 RX ORDER — HEPARIN SODIUM 5000 [USP'U]/ML
5000 INJECTION INTRAVENOUS; SUBCUTANEOUS EVERY 12 HOURS
Refills: 0 | Status: DISCONTINUED | OUTPATIENT
Start: 2023-08-22 | End: 2023-08-25

## 2023-08-22 RX ORDER — MAGNESIUM HYDROXIDE 400 MG/1
30 TABLET, CHEWABLE ORAL
Refills: 0 | Status: DISCONTINUED | OUTPATIENT
Start: 2023-08-22 | End: 2023-08-25

## 2023-08-22 RX ORDER — TETANUS TOXOID, REDUCED DIPHTHERIA TOXOID AND ACELLULAR PERTUSSIS VACCINE, ADSORBED 5; 2.5; 8; 8; 2.5 [IU]/.5ML; [IU]/.5ML; UG/.5ML; UG/.5ML; UG/.5ML
0.5 SUSPENSION INTRAMUSCULAR ONCE
Refills: 0 | Status: DISCONTINUED | OUTPATIENT
Start: 2023-08-22 | End: 2023-08-25

## 2023-08-22 RX ORDER — OXYTOCIN 10 UNIT/ML
333.33 VIAL (ML) INJECTION
Qty: 20 | Refills: 0 | Status: DISCONTINUED | OUTPATIENT
Start: 2023-08-22 | End: 2023-08-25

## 2023-08-22 RX ORDER — DIPHENHYDRAMINE HCL 50 MG
25 CAPSULE ORAL EVERY 4 HOURS
Refills: 0 | Status: DISCONTINUED | OUTPATIENT
Start: 2023-08-22 | End: 2023-08-23

## 2023-08-22 RX ORDER — DEXAMETHASONE 0.5 MG/5ML
4 ELIXIR ORAL EVERY 6 HOURS
Refills: 0 | Status: DISCONTINUED | OUTPATIENT
Start: 2023-08-22 | End: 2023-08-23

## 2023-08-22 RX ORDER — DIPHENHYDRAMINE HCL 50 MG
25 CAPSULE ORAL EVERY 4 HOURS
Refills: 0 | Status: DISCONTINUED | OUTPATIENT
Start: 2023-08-22 | End: 2023-08-25

## 2023-08-22 RX ORDER — ONDANSETRON 8 MG/1
4 TABLET, FILM COATED ORAL EVERY 6 HOURS
Refills: 0 | Status: DISCONTINUED | OUTPATIENT
Start: 2023-08-22 | End: 2023-08-23

## 2023-08-22 RX ORDER — MAGNESIUM SULFATE 500 MG/ML
1.5 VIAL (ML) INJECTION
Qty: 40 | Refills: 0 | Status: DISCONTINUED | OUTPATIENT
Start: 2023-08-22 | End: 2023-08-23

## 2023-08-22 RX ORDER — DIPHENHYDRAMINE HCL 50 MG
25 CAPSULE ORAL EVERY 6 HOURS
Refills: 0 | Status: DISCONTINUED | OUTPATIENT
Start: 2023-08-22 | End: 2023-08-25

## 2023-08-22 RX ORDER — OXYCODONE HYDROCHLORIDE 5 MG/1
5 TABLET ORAL
Refills: 0 | Status: DISCONTINUED | OUTPATIENT
Start: 2023-08-22 | End: 2023-08-23

## 2023-08-22 RX ORDER — NALBUPHINE HYDROCHLORIDE 10 MG/ML
2.5 INJECTION, SOLUTION INTRAMUSCULAR; INTRAVENOUS; SUBCUTANEOUS EVERY 6 HOURS
Refills: 0 | Status: DISCONTINUED | OUTPATIENT
Start: 2023-08-22 | End: 2023-08-23

## 2023-08-22 RX ORDER — MORPHINE SULFATE 50 MG/1
0.1 CAPSULE, EXTENDED RELEASE ORAL ONCE
Refills: 0 | Status: DISCONTINUED | OUTPATIENT
Start: 2023-08-22 | End: 2023-08-23

## 2023-08-22 RX ORDER — NALOXONE HYDROCHLORIDE 4 MG/.1ML
0.1 SPRAY NASAL
Refills: 0 | Status: DISCONTINUED | OUTPATIENT
Start: 2023-08-22 | End: 2023-08-23

## 2023-08-22 RX ORDER — IBUPROFEN 200 MG
600 TABLET ORAL EVERY 6 HOURS
Refills: 0 | Status: COMPLETED | OUTPATIENT
Start: 2023-08-22 | End: 2024-07-20

## 2023-08-22 RX ORDER — OXYCODONE HYDROCHLORIDE 5 MG/1
5 TABLET ORAL
Refills: 0 | Status: COMPLETED | OUTPATIENT
Start: 2023-08-22 | End: 2023-08-29

## 2023-08-22 RX ORDER — LANOLIN
1 OINTMENT (GRAM) TOPICAL EVERY 6 HOURS
Refills: 0 | Status: DISCONTINUED | OUTPATIENT
Start: 2023-08-22 | End: 2023-08-25

## 2023-08-22 RX ORDER — KETOROLAC TROMETHAMINE 30 MG/ML
30 SYRINGE (ML) INJECTION EVERY 6 HOURS
Refills: 0 | Status: COMPLETED | OUTPATIENT
Start: 2023-08-22 | End: 2023-08-23

## 2023-08-22 RX ORDER — ACETAMINOPHEN 500 MG
975 TABLET ORAL
Refills: 0 | Status: DISCONTINUED | OUTPATIENT
Start: 2023-08-22 | End: 2023-08-25

## 2023-08-22 RX ORDER — ACETAMINOPHEN 500 MG
1000 TABLET ORAL ONCE
Refills: 0 | Status: COMPLETED | OUTPATIENT
Start: 2023-08-22 | End: 2023-08-22

## 2023-08-22 RX ORDER — SODIUM CHLORIDE 9 MG/ML
1000 INJECTION, SOLUTION INTRAVENOUS
Refills: 0 | Status: DISCONTINUED | OUTPATIENT
Start: 2023-08-22 | End: 2023-08-25

## 2023-08-22 RX ORDER — METOCLOPRAMIDE HCL 10 MG
10 TABLET ORAL ONCE
Refills: 0 | Status: COMPLETED | OUTPATIENT
Start: 2023-08-22 | End: 2023-08-22

## 2023-08-22 RX ADMIN — Medication 400 MILLIGRAM(S): at 07:37

## 2023-08-22 RX ADMIN — Medication 1000 MILLIGRAM(S): at 08:30

## 2023-08-22 RX ADMIN — Medication 975 MILLIGRAM(S): at 23:04

## 2023-08-22 RX ADMIN — Medication 30 MILLIGRAM(S): at 20:18

## 2023-08-22 RX ADMIN — HEPARIN SODIUM 5000 UNIT(S): 5000 INJECTION INTRAVENOUS; SUBCUTANEOUS at 17:17

## 2023-08-22 RX ADMIN — Medication 10 MILLIGRAM(S): at 23:50

## 2023-08-22 RX ADMIN — Medication 30 MILLIGRAM(S): at 14:43

## 2023-08-22 RX ADMIN — Medication 25 MILLIGRAM(S): at 23:51

## 2023-08-22 RX ADMIN — Medication 975 MILLIGRAM(S): at 17:17

## 2023-08-22 RX ADMIN — Medication 975 MILLIGRAM(S): at 12:59

## 2023-08-22 RX ADMIN — Medication 975 MILLIGRAM(S): at 11:55

## 2023-08-22 RX ADMIN — Medication 30 MILLIGRAM(S): at 23:02

## 2023-08-22 RX ADMIN — Medication 30 MILLIGRAM(S): at 21:18

## 2023-08-22 RX ADMIN — Medication 30 MILLIGRAM(S): at 15:15

## 2023-08-22 RX ADMIN — Medication 50 GM/HR: at 00:03

## 2023-08-22 NOTE — CONSULT NOTE ADULT - ASSESSMENT
Impression:  37-year-old woman with PMHx of brain tumor at age 26 which was resected by Dr. Burton at [facility]. At the age of 9 she developed seizures often characterized by right sided shaking for minutes. She has also experienced grand mal seizures. The tumor was identified at this age and followed until she was about 26 at which time she underwent surgical resection.  Seizures resolved at about the age of 27 - she reports one seizure following the surgery but otherwise she has been seizure free. Seizure medication was discontinued at about the age of 29 (was previously on keppra, lamictal and depakote).    Neurology consulted as anesthesia is requesting for [facility] for clearance for neuroaxial anesthesia.    Patient follows wit Dr. Cipriano Patino 1 year ago and reported she was stable.     Diagnosis:  history of seizures   history of brain tumor    Recommendations:  no contraindication to neuroaxial anesthesia  no contraindication to any necessary obstetrical care   discussed with patient  discussed with family at bedside

## 2023-08-22 NOTE — OB PROVIDER DELIVERY SUMMARY - NSCOUNTCORRECT_OBGYN_ALL_OB
Alvaro, can we look for a day that Lupis can go on my schedule in August and that Dr. Henley can staff (maybe at end of day)?  Laps, needles and instruments count was reported as correct.

## 2023-08-22 NOTE — CONSULT NOTE ADULT - ASSESSMENT
37y (1985) woman with PMH Benign Brain tumor unspecified s/ resection 2008, Hx seizure not on ASMs, DM2 presenting after not being able to feel her baby move. Per OB, patient will need urgent delivery. Neurology consulted as anesthesia is requesting for house neurology for clearance for neuroaxial anesthesia. Patient reports initally presetingw ith seizures in 2008, imaging hsoed she had brain tumor. After resection she had 1 additional seizure after resection. She was previously on keppra, lamictal and depakote, however has been tapered off these medications and has been seizure free for many years (does not know exact time). Denies recent seizures, weakness, numbness, changes to speech ,headache. Patient follows wit Dr. Cipriano Patino 1 year ago and reported she was stable.     Impression: Seizure disorder from brain tumor, now seizure free and not on any antiseizure medications     Recommendation:  [] No neurological contraindication to neuraxial anesthesia  [] Seizure precautions  [] Please note: if patient has a convulsion, please document length of episode, specifically what patient was doing paying attention to eye opening vs closure, gaze deviation, shaking of extremities, tongue bite, urinary incontinence, any derangement of vital signs. Generalized motor seizures can have dilated and unreactive pupils, absent oculocephalic reflexes (no dolls eyes), and open eyelids (99% of cases). Head turning from sided to side, pelvic thrusting, bicycling movements, hand waving are NOT manifestations of generalized motor seizures.    Discussed with Dr. Rinaldi attending.  37y (1985) woman with PMH Benign Brain tumor unspecified s/ resection 2008, Hx seizure not on ASMs, DM2 presenting after not being able to feel her baby move. Per OB, patient will need urgent delivery. Neurology consulted as anesthesia is requesting for house neurology for clearance for neuroaxial anesthesia. Patient reports initally presetingw ith seizures in 2008, imaging hsoed she had brain tumor. After resection she had 1 additional seizure after resection. She was previously on keppra, lamictal and depakote, however has been tapered off these medications and has been seizure free for many years (does not know exact time). Denies recent seizures, weakness, numbness, changes to speech ,headache. Patient follows wit Dr. Cipriano Patino 1 year ago and reported she was stable.     Impression: Seizure disorder from brain tumor s/p resection, now seizure free and not on any antiseizure medications     Recommendation:  [] No neurological contraindication to neuraxial anesthesia  [] Seizure precautions  [] Please note: if patient has a convulsion, please document length of episode, specifically what patient was doing paying attention to eye opening vs closure, gaze deviation, shaking of extremities, tongue bite, urinary incontinence, any derangement of vital signs. Generalized motor seizures can have dilated and unreactive pupils, absent oculocephalic reflexes (no dolls eyes), and open eyelids (99% of cases). Head turning from sided to side, pelvic thrusting, bicycling movements, hand waving are NOT manifestations of generalized motor seizures.    Discussed with Dr. Rinaldi attending.

## 2023-08-22 NOTE — CONSULT NOTE ADULT - SUBJECTIVE AND OBJECTIVE BOX
Neurology - Consult Note    -  Spectra: [phone] ([facility]), [phone] ([facility])  -    HPI: Patient TANIA CARBALLO is a 37y (1985) woman with PMH Benign Brain tumor unspecified s/ resection 2008, Hx seizure not on ASMs, DM2 presenting after not being able to feel her baby move. Per OB, patient will need urgent delivery. Neurology consulted as anesthesia is requesting for house neurology for clearance for neuroaxial anesthesia. Patient reports initally presetingw ith seizures in 2008, imaging hsoed she had brain tumor. After resection she had 1 additional seizure after resection. She was previously on keppra, lamictal and depakote, however has been tapered off these medications and has been seizure free for many years (does not know exact time). Denies recent seizures, weakness, numbness, changes to speech ,headache.     Patient follows wit Dr. Cipriano Patino 1 year ago and reported she was stable.       Review of Systems:  All other review of systems is negative unless indicated above.    Allergies:  No Known Allergies      PMHx/PSHx/Family Hx: As above, otherwise see below   Seizure    Depression    Brain tumor        Social Hx:  No current use of tobacco, alcohol, or illicit drugs      Medications:  MEDICATIONS  (STANDING):  citric acid/sodium citrate Solution 30 milliLiter(s) Oral once  famotidine Injectable 20 milliGRAM(s) IV Push once  lactated ringers Bolus 1000 milliLiter(s) IV Bolus once  lactated ringers. 1000 milliLiter(s) (125 mL/Hr) IV Continuous <Continuous>  magnesium sulfate Infusion 2 Gm/Hr (50 mL/Hr) IV Continuous <Continuous>  NIFEdipine XL 30 milliGRAM(s) Oral every 24 hours  oxytocin Infusion 333.333 milliUNIT(s)/Min (1000 mL/Hr) IV Continuous <Continuous>    MEDICATIONS  (PRN):      Vitals:  T(C): 36.8 (08-22-23 @ 00:18), Max: 36.8 (08-21-23 @ 22:28)  HR: 100 (08-22-23 @ 01:06) (77 - 100)  BP: 154/93 (08-22-23 @ 01:00) (139/85 - 162/94)  RR: 18 (08-22-23 @ 00:18) (18 - 18)  SpO2: 98% (08-22-23 @ 01:06) (98% - 100%)    Physical Examination:  General - NAD    Neurologic Exam:  Mental status - Awake, Alert, Oriented to person, place, and time. Speech fluent, repetition and naming intact. Follows simple and complex commands.    Cranial nerves - PERRL, VFF, EOMI, face sensation (V1-V3) intact b/l, facial strength intact without asymmetry b/l, hearing intact b/l, palate with symmetric elevation, trapezius  5/5 strength b/l, tongue midline on protrusion with full lateral movement    Motor - Normal bulk and tone throughout. No pronator drift.  Strength testing            Deltoid      Biceps      Triceps     Wrist Extension    Wrist Flexion        R            5                 5               5                     5                              5                        5                L             5                 5               5                     5                              5                        5                              Hip Flexion    Hip Extension     Dorsiflexion    Plantar Flexion  R              5                           5                       5                           5                           L              5                           5                        5                           5                             Sensation - Light touch/temperature intact throughout    DTR's -             Biceps      Triceps     Brachioradialis      Patellar    Ankle    Toes/plantar response  R             2+             2+                  2+                       2+            2+                 Down  L              2+             2+                 2+                        2+           2+                 Down    Coordination - Finger to Nose intact b/l. No tremors appreciated    Gait and station -Deffered     Labs:                        12.6   11.71 )-----------( 171      ( 21 Aug 2023 23:05 )             36.3     08-21    133<L>  |  104  |  12  ----------------------------<  131<H>  4.1   |  18<L>  |  1.00    Ca    9.3      21 Aug 2023 23:05    TPro  6.1  /  Alb  2.9<L>  /  TBili  0.2  /  DBili  x   /  AST  17  /  ALT  20  /  AlkPhos  220<H>  08-21    CAPILLARY BLOOD GLUCOSE        LIVER FUNCTIONS - ( 21 Aug 2023 23:05 )  Alb: 2.9 g/dL / Pro: 6.1 g/dL / ALK PHOS: 220 U/L / ALT: 20 U/L / AST: 17 U/L / GGT: x             PT/INR - ( 21 Aug 2023 23:05 )   PT: 9.6 sec;   INR: 0.87 ratio         PTT - ( 21 Aug 2023 23:05 )  PTT:32.8 sec            Radiology:     Neurology - Consult Note    -  Spectra: [phone] ([facility]), [phone] ([facility])  -    HPI: Patient TANIA CARBALLO is a 37y (1985) woman with PMH Benign Brain tumor unspecified s/ resection 2008, Hx seizure not on ASMs, DM2 presenting after not being able to feel her baby move. Per OB, patient will need urgent delivery. Neurology consulted as anesthesia is requesting for house neurology for clearance for neuroaxial anesthesia. Patient reports initally presetingw ith seizures in 2008, imaging hsoed she had brain tumor. After resection she had 1 additional seizure after resection. She was previously on keppra, lamictal and depakote, however has been tapered off these medications and has been seizure free for many years (does not know exact time). Denies recent seizures, weakness, numbness, changes to speech ,headache.     Patient follows wit Dr. Cipriano Patino 1 year ago and reported she was stable.       Review of Systems:  All other review of systems is negative unless indicated above.    Allergies:  No Known Allergies      PMHx/PSHx/Family Hx: As above, otherwise see below   Seizure    Depression    Brain tumor        Social Hx:  No current use of tobacco, alcohol, or illicit drugs      Medications:  MEDICATIONS  (STANDING):  citric acid/sodium citrate Solution 30 milliLiter(s) Oral once  famotidine Injectable 20 milliGRAM(s) IV Push once  lactated ringers Bolus 1000 milliLiter(s) IV Bolus once  lactated ringers. 1000 milliLiter(s) (125 mL/Hr) IV Continuous <Continuous>  magnesium sulfate Infusion 2 Gm/Hr (50 mL/Hr) IV Continuous <Continuous>  NIFEdipine XL 30 milliGRAM(s) Oral every 24 hours  oxytocin Infusion 333.333 milliUNIT(s)/Min (1000 mL/Hr) IV Continuous <Continuous>    MEDICATIONS  (PRN):      Vitals:  T(C): 36.8 (08-22-23 @ 00:18), Max: 36.8 (08-21-23 @ 22:28)  HR: 100 (08-22-23 @ 01:06) (77 - 100)  BP: 154/93 (08-22-23 @ 01:00) (139/85 - 162/94)  RR: 18 (08-22-23 @ 00:18) (18 - 18)  SpO2: 98% (08-22-23 @ 01:06) (98% - 100%)    Physical Examination:  General - NAD    Neurologic Exam:  Mental status - Awake, Alert, Oriented to person, place, and time. Speech clear, fluent. Follows simple and complex commands.    Cranial nerves - PERRL, VFF, EOMI, face sensation (V1-V3) intact b/l, facial strength intact without asymmetry b/l, hearing intact b/l, palate with symmetric elevation, trapezius  5/5 strength b/l, tongue midline on protrusion with full lateral movement    Motor - Normal bulk and tone throughout.   Strength testing            Deltoid      Biceps      Triceps     Wrist Extension    Wrist Flexion        R            5                 5               5                     5                              5                        5                L             5                 5               5                     5                              5                        5                              Hip Flexion    Hip Extension     Dorsiflexion    Plantar Flexion  R              5                           5                       5                           5                           L              5                           5                        5                           5                             Sensation - Light touch intact throughout      Coordination - Finger to Nose intact b/l. No tremors appreciated    Gait and station -Deffered     Labs:                        12.6   11.71 )-----------( 171      ( 21 Aug 2023 23:05 )             36.3     08-21    133<L>  |  104  |  12  ----------------------------<  131<H>  4.1   |  18<L>  |  1.00    Ca    9.3      21 Aug 2023 23:05    TPro  6.1  /  Alb  2.9<L>  /  TBili  0.2  /  DBili  x   /  AST  17  /  ALT  20  /  AlkPhos  220<H>  08-21    CAPILLARY BLOOD GLUCOSE        LIVER FUNCTIONS - ( 21 Aug 2023 23:05 )  Alb: 2.9 g/dL / Pro: 6.1 g/dL / ALK PHOS: 220 U/L / ALT: 20 U/L / AST: 17 U/L / GGT: x             PT/INR - ( 21 Aug 2023 23:05 )   PT: 9.6 sec;   INR: 0.87 ratio         PTT - ( 21 Aug 2023 23:05 )  PTT:32.8 sec            Radiology:     Neurology - Consult Note    -  Spectra: [phone] ([facility]), [phone] ([facility])  -    HPI: Patient TANIA CARBALLO is a 37y (1985) woman with PMH Benign Brain tumor unspecified s/ resection 2008, Hx seizure not on ASMs, DM2 presenting after not being able to feel her baby move. Per OB, patient will need urgent delivery. Neurology consulted as anesthesia is requesting for house neurology for clearance for neuroaxial anesthesia. Patient reports initally presetingw ith seizures in 2008, imaging hsoed she had brain tumor. After resection she had 1 additional seizure after resection. She was previously on keppra, lamictal and depakote, however has been tapered off these medications and has been seizure free for many years (does not know exact time). Denies recent seizures, weakness, numbness, changes to speech ,headache.     Patient follows wit Dr. Cipriano Patino 1 year ago and reported she was stable.       Review of Systems:  All other review of systems is negative unless indicated above.    Allergies:  No Known Allergies      PMHx/PSHx/Family Hx: As above, otherwise see below   Seizure    Depression    Brain tumor        Social Hx:  No current use of tobacco, alcohol, or illicit drugs      Medications:  MEDICATIONS  (STANDING):  citric acid/sodium citrate Solution 30 milliLiter(s) Oral once  famotidine Injectable 20 milliGRAM(s) IV Push once  lactated ringers Bolus 1000 milliLiter(s) IV Bolus once  lactated ringers. 1000 milliLiter(s) (125 mL/Hr) IV Continuous <Continuous>  magnesium sulfate Infusion 2 Gm/Hr (50 mL/Hr) IV Continuous <Continuous>  NIFEdipine XL 30 milliGRAM(s) Oral every 24 hours  oxytocin Infusion 333.333 milliUNIT(s)/Min (1000 mL/Hr) IV Continuous <Continuous>    MEDICATIONS  (PRN):      Vitals:  T(C): 36.8 (08-22-23 @ 00:18), Max: 36.8 (08-21-23 @ 22:28)  HR: 100 (08-22-23 @ 01:06) (77 - 100)  BP: 154/93 (08-22-23 @ 01:00) (139/85 - 162/94)  RR: 18 (08-22-23 @ 00:18) (18 - 18)  SpO2: 98% (08-22-23 @ 01:06) (98% - 100%)    Physical Examination:  General - NAD    Neurologic Exam:  Mental status - Awake, Alert, Oriented to person, place, and time. Speech clear, fluent. Follows simple and complex commands.    Cranial nerves - PERRL, VFF, EOMI, face sensation (V1-V3) intact b/l, facial strength intact without asymmetry b/l, hearing intact b/l, palate with symmetric elevation, trapezius  5/5 strength b/l, tongue midline on protrusion with full lateral movement    Motor - Normal bulk and tone throughout.   Strength testing            Deltoid      Biceps      Triceps     Wrist Extension    Wrist Flexion        R            5                 5               5                     5                              5                        5                L             5                 5               5                     5                              5                        5                              Hip Flexion    Hip Extension     Dorsiflexion    Plantar Flexion  R              5                           5                       5                           5                           L              5                           5                        5                           5                             DTR's -             Biceps      Triceps     Brachioradialis      Patellar    Ankle    Toes/plantar response  R             2+             2+                  2+                       2+            2+                 Down  L              2+             2+                 2+                        2+           2+                 Down    Sensation - Light touch intact throughout      Coordination - Finger to Nose intact b/l. No tremors appreciated    Gait and station -Deffered due to fall/safety percautions    Labs:                        12.6   11.71 )-----------( 171      ( 21 Aug 2023 23:05 )             36.3     08-21    133<L>  |  104  |  12  ----------------------------<  131<H>  4.1   |  18<L>  |  1.00    Ca    9.3      21 Aug 2023 23:05    TPro  6.1  /  Alb  2.9<L>  /  TBili  0.2  /  DBili  x   /  AST  17  /  ALT  20  /  AlkPhos  220<H>  08-21    CAPILLARY BLOOD GLUCOSE        LIVER FUNCTIONS - ( 21 Aug 2023 23:05 )  Alb: 2.9 g/dL / Pro: 6.1 g/dL / ALK PHOS: 220 U/L / ALT: 20 U/L / AST: 17 U/L / GGT: x             PT/INR - ( 21 Aug 2023 23:05 )   PT: 9.6 sec;   INR: 0.87 ratio         PTT - ( 21 Aug 2023 23:05 )  PTT:32.8 sec            Radiology:

## 2023-08-22 NOTE — CHART NOTE - NSCHARTNOTEFT_GEN_A_CORE
R4 Chart Note     Patient discussed with outpatient neurologist Dr. Cipriano Topete. Per Dr. Topete, patient was last seen in 2022 at which time she was trying to conceive. MRI performed at this time showed reactive changes in the tumor resection bed, no residual tumor or other lesion, no concern for intracranial hypertension. Dr. Topete states no contraindication to neuraxial anesthesia.     MRI record to be faxed over for review.     [facility] neuro to evaluate patient to confirm no further clinical concern.     Sandra Curran MD PGY4   Information relayed to Dr. Back and Dr. Landers

## 2023-08-22 NOTE — CONSULT NOTE ADULT - SUBJECTIVE AND OBJECTIVE BOX
Admitting Diagnosis:  Other pregnancy-related conditions, antepartum [O26.899]    Other pregnancy-related conditions, antepartum [O26.899]  OTH PREGNANCY RELATED CONDITIONS, UNSPECIFIED TRIMESTER    Supervision of other normal pregnancy, antepartum [Z34.80]  ENCOUNTER FOR CLAUDINE OF NORMAL PREGNANCY, UNSP TRIMESTER        HPI:    37-year-old woman with PMHx of brain tumor at age 26 which was resected by Dr. Burton at [facility]. At the age of 9 she developed seizures often characterized by right sided shaking for minutes. She has also experienced grand mal seizures. The tumor was identified at this age and followed until she was about 26 at which time she underwent surgical resection.  Seizures resolved at about the age of 27 - she reports one seizure following the surgery but otherwise she has been seizure free. Seizure medication was discontinued at about the age of 29 (was previously on keppra, lamictal and depakote).    Neurology consulted as anesthesia is requesting for [facility] neurology for clearance for neuroaxial anesthesia.    Patient follows wit Dr. Cipriano Patino 1 year ago and reported she was stable.       ******    Past Medical History:  Seizure [780.39]  last seizure over 15 yrs ago.    Depression [311]    Brain tumor [239.6]  benign        Past Surgical History:  S/P brain surgery [V45.89]        Social History:  No toxic habits    Family History:  FAMILY HISTORY:  FH: diabetes mellitus (Mother)    FH: breast cancer (Aunt)    FH: thyroid disease (Mother)        Allergies:  No Known Allergies      ROS:  Constitutional: Patient offers no complaints of fevers or significant weight loss  Ears, Nose, Mouth and Throat: The patient presents with no abnormalities of the head, ears, eyes, nose or throat  Skin: Patient offers no concerns of new rashes or lesions  Respiratory: The patient presents with no abnormalities of the respiratory tract  Cardiovascular: The patient presents with no cardiac abnormalities  Gastrointestinal: The patient presents with no abnormalities of the GI system  Genitourinary: The patient presents with no dysuria, hematuria or frequent urination  Neurological: See HPI  Endocrine: Patient offers no complaints of excessive thirst, urination, or heat/cold intolerance    Advanced care planning reviewed and noted in the chart.    Medications:  acetaminophen     Tablet .. 975 milliGRAM(s) Oral <User Schedule>  dexAMETHasone  Injectable 4 milliGRAM(s) IV Push every 6 hours PRN  diphenhydrAMINE 25 milliGRAM(s) Oral every 4 hours PRN  diphenhydrAMINE 25 milliGRAM(s) Oral every 6 hours PRN  diphtheria/tetanus/pertussis (acellular) Vaccine (Adacel) 0.5 milliLiter(s) IntraMuscular once  ibuprofen  Tablet. 600 milliGRAM(s) Oral every 6 hours  ketorolac   Injectable 30 milliGRAM(s) IV Push every 6 hours  lactated ringers. 1000 milliLiter(s) IV Continuous <Continuous>  lanolin Ointment 1 Application(s) Topical every 6 hours PRN  magnesium hydroxide Suspension 30 milliLiter(s) Oral two times a day PRN  morphine PF Spinal 0.1 milliGRAM(s) IntraThecal. once  nalbuphine Injectable 2.5 milliGRAM(s) IV Push every 6 hours PRN  naloxone Injectable 0.1 milliGRAM(s) IV Push every 3 minutes PRN  NIFEdipine XL 30 milliGRAM(s) Oral every 24 hours  ondansetron Injectable 4 milliGRAM(s) IV Push every 6 hours PRN  oxyCODONE    IR 5 milliGRAM(s) Oral every 3 hours PRN  oxyCODONE    IR 5 milliGRAM(s) Oral once PRN  oxyCODONE    IR 5 milliGRAM(s) Oral every 3 hours PRN  oxytocin Infusion 333.333 milliUNIT(s)/Min IV Continuous <Continuous>  oxytocin Infusion 333.333 milliUNIT(s)/Min IV Continuous <Continuous>  simethicone 80 milliGRAM(s) Chew every 4 hours PRN      Labs:  CBC Full  -  ( 21 Aug 2023 23:05 )  WBC Count : 11.71 K/uL  RBC Count : 4.20 M/uL  Hemoglobin : 12.6 g/dL  Hematocrit : 36.3 %  Platelet Count - Automated : 171 K/uL  Mean Cell Volume : 86.4 fl  Mean Cell Hemoglobin : 30.0 pg  Mean Cell Hemoglobin Concentration : 34.7 gm/dL  Auto Neutrophil # : 7.72 K/uL  Auto Lymphocyte # : 2.95 K/uL  Auto Monocyte # : 0.84 K/uL  Auto Eosinophil # : 0.12 K/uL  Auto Basophil # : 0.04 K/uL  Auto Neutrophil % : 66.0 %  Auto Lymphocyte % : 25.2 %  Auto Monocyte % : 7.2 %  Auto Eosinophil % : 1.0 %  Auto Basophil % : 0.3 %        133<L>  |  104  |  12  ----------------------------<  131<H>  4.1   |  18<L>  |  1.00    Ca    9.3      21 Aug 2023 23:05  Mg     4.9         TPro  6.1  /  Alb  2.9<L>  /  TBili  0.2  /  DBili  x   /  AST  17  /  ALT  20  /  AlkPhos  220<H>      CAPILLARY BLOOD GLUCOSE        LIVER FUNCTIONS - ( 21 Aug 2023 23:05 )  Alb: 2.9 g/dL / Pro: 6.1 g/dL / ALK PHOS: 220 U/L / ALT: 20 U/L / AST: 17 U/L / GGT: x           PT/INR - ( 21 Aug 2023 23:05 )   PT: 9.6 sec;   INR: 0.87 ratio         PTT - ( 21 Aug 2023 23:05 )  PTT:32.8 sec  Urinalysis Basic - ( 22 Aug 2023 01:34 )    Color: Yellow / Appearance: Clear / S.011 / pH: x  Gluc: x / Ketone: Negative  / Bili: Negative / Urobili: <2 mg/dL   Blood: x / Protein: 30 mg/dL / Nitrite: Negative   Leuk Esterase: Negative / RBC: 2 /hpf / WBC 2 /HPF   Sq Epi: x / Non Sq Epi: x / Bacteria: Negative      Female    Vitals:  Vital Signs Last 24 Hrs  T(C): 36.5 (22 Aug 2023 05:20), Max: 36.8 (21 Aug 2023 22:28)  T(F): 97.7 (22 Aug 2023 05:20), Max: 98.2 (21 Aug 2023 22:28)  HR: 84 (22 Aug 2023 08:05) (77 - 102)  BP: 140/85 (22 Aug 2023 08:05) (120/78 - 162/94)  BP(mean): 106 (22 Aug 2023 08:05) (94 - 110)  RR: 13 (22 Aug 2023 08:05) (11 - 20)  SpO2: 98% (22 Aug 2023 08:05) (97% - 100%)    Parameters below as of 22 Aug 2023 07:30  Patient On (Oxygen Delivery Method): room air        NEUROLOGICAL EXAM:    Mental status: Awake, alert, and in no apparent distress. Oriented to person, place and time. Language function is normal. Recent memory, digit span and concentration were normal.     Cranial Nerves: Pupils were equal, round, reactive to light. Extraocular movements were intact. Visual field were full. Fundoscopic exam was deferred. Facial sensation was intact to light touch. There was no facial asymmetry. The palate was upgoing symmetrically and tongue was midline. Hearing acuity was intact to finger rub AU. Shoulder shrug was full bilaterally    Motor exam: Bulk and tone were normal. Strength was 5/5 in all four extremities. Fine finger movements were symmetric and normal. There was no pronator drift    Reflexes: 2+ in the bilateral upper extremities. 2+ in the bilateral lower extremities. Toes were downgoing bilaterally.     Sensation: Intact to light touch, temperature, vibration and proprioception.     Coordination: Finger-nose-finger and heel-to-shin was without dysmetria.     Gait: Narrow base and steady. Romberg was negative.

## 2023-08-22 NOTE — OB PROVIDER DELIVERY SUMMARY - NSLOWPPHRISK_OBGYN_A_OB
No previous uterine incision/[place] Pregnancy/Less than or equal to 4 previous vaginal births/No known bleeding disorder/No history of postpartum hemorrhage/No other PPH risks indicated

## 2023-08-22 NOTE — PROVIDER CONTACT NOTE (OTHER) - ACTION/TREATMENT ORDERED:
As per Dr. Morales, lower mg to 1.5 grams. Dr. Morales to order benadryl and reglan and assess pt at bedside.

## 2023-08-22 NOTE — OB RN INTRAOPERATIVE NOTE - NSSELHIDDEN_OBGYN_ALL_OB_FT
[NS_DeliveryAttending1_OBGYN_ALL_OB_FT:[id number]],[NS_DeliveryAssist1_OBGYN_ALL_OB_FT:[id number]],[NS_DeliveryRN_OBGYN_ALL_OB_FT:[id number]]

## 2023-08-22 NOTE — OB RN INTRAOPERATIVE NOTE - NS_ADD OB DELIVERY PROCEDURE_OBGYN_ALL_OB
"      [address]  [address] [facility]      Raúl Gong : 2006 MRN: [id number]        2018 Time Session Began: 8PM  Time Session Ended: 8:50PM    Session Type:     Family    Others Present:   Father    Intervention:     Behavioral    Suicide/Homicide/Violence Ideation: No    If yes, explain:     Current Outpatient Prescriptions   Medication Sig   â¢ sertraline (ZOLOFT) 50 MG tablet Take one daily   â¢ gabapentin (NEURONTIN) 100 MG capsule two daily     No current facility-administered medications for this visit. Change in Medication(s) Reported: No   If yes, explain:     Patient/Family Education Provided: Yes    Patient/Family Displays Understanding: Yes    If no, explain:     Chief complaint in patient's own words:   Behavior issues    Progress Note containing chief complaint and symptoms/problems related to the complaint:    DATA:   Patient has completed Day Treatment programming and returns to continue with outpatient therapy. Patient reports she learned coping skills and about her ""triggers\"". ACTION:   Processed the treatment experience and focused on skills gained and how to continue these. Also addressed patient's feelings about her relationship with father and his soon to be wife. RESPONSE:   Patient identified triggers as being told what to do, not being understood, and being accused of behaviors she doesn't believe she did. She reports counting, listening to music and taking deep breaths as her coping skills. Patient is struggling to accept father getting  in April of this year. She continues to worry that father's wife will replace her. She was able to talk about this openly with father. PLAN:   Continue individual and family therapy.     Need for [employer] Assessed: Yes    Resources Needed: No    If yes, what resources:     Primary Diagnosis: F34.81 DMDD (disruptive mood " dysregulation disorder) (CMS/HCC)  (primary encounter diagnosis)    Treatment Plan: Unchanged    Discharge Plan: N/A    Next Appointment:   2 weeks  Gaston Otero LCSW Click here

## 2023-08-22 NOTE — PRE-ANESTHESIA EVALUATION ADULT - NSANTHADDINFOFT_GEN_ALL_CORE
The patient has a history of intracranial mass resection, per history from the patient she has had stable MRI and no signs of increased intracranial pressure/csf outflow obstruction.  Some headaches/migraine were noted earlier in the pregnancy.  No records are available.  Neurosurgery was consulted and outpatient neurologist is being contacted to determine if any contraindication to neuraxial anesthesia. The patient has a history of intracranial mass resection, per history from the patient she has had stable MRI and no signs of increased intracranial pressure/csf outflow obstruction.  Some headaches/migraine were noted earlier in the pregnancy.  No records are available.  Neurosurgery was consulted and outpatient neurologist is being contacted to determine if any contraindication to neuraxial anesthesia.    Neurology service saw and evaluated patient.  Felt there was no contraindication to neuraxial anesthesia.  Will proceed with spinal anesthesia for c/s at this time.

## 2023-08-22 NOTE — OB RN DELIVERY SUMMARY - NS_SEPSISRSKCALC_OBGYN_ALL_OB_FT
EOS calculated successfully. EOS Risk Factor: 0.5/1000 live births ([facility] national incidence); GA=34w2d; Temp=98.2; ROM=0; GBS='Unknown'; Antibiotics='No antibiotics or any antibiotics < 2 hrs prior to birth'

## 2023-08-22 NOTE — OB RN INTRAOPERATIVE NOTE - NS_SUBSEQUENTSURG_OBGYN_ALL_OB
Opioid Pregnancy And Lactation Text: These medications can lead to premature delivery and should be avoided during pregnancy. These medications are also present in breast milk in small amounts. Yes

## 2023-08-22 NOTE — OB RN DELIVERY SUMMARY - NSSELHIDDEN_OBGYN_ALL_OB_FT
[NS_DeliveryAttending1_OBGYN_ALL_OB_FT:[id number]],[NS_DeliveryAssist1_OBGYN_ALL_OB_FT:[id number]],[NS_DeliveryRN_OBGYN_ALL_OB_FT:[id number]]

## 2023-08-22 NOTE — OB PROVIDER DELIVERY SUMMARY - NSPROVIDERDELIVERYNOTE_OBGYN_ALL_OB_FT
Ms Junior was admitted with gestational HTN with supraimposed preeclampsia. She was found to be breech with poor fetal tone, no fetal breathing, small amount of fetal movement and NR NST. BP was in sever range and she was started on Magnesium sulfate and given procardia 10 mg IVP.   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 Tumor had been resected after 4 surgical procedures over 15 years ago. she had seen her neurologist in  prior to pregnancy to get clearance to do. She had an MRI of the brain. No tumors or masses were evident. There were surgical postop changes. She has not had any seizures since the tumor was resected. She was evaluated by neuro prior to C/S and was given clearance for spinal anesthesia.  She was taken to the OR. She was delivered by LFTCS of a viable baby girl via breech extraction-RSA.  Clear fluid was noted. Baby cried spontaneously. She was handed to the  team who were in attendance. Apgars                        weight  Placenta was delivered spontaneously and intact with 3 vessel cord. Placenta was sent to pathology.   ml   ml  FI 2000 ml  Ancef 2 grams given  Betadine vaginal prep. Ms Junior was admitted with gestational HTN with supraimposed preeclampsia. She was found to be breech with poor fetal tone, no fetal breathing, small amount of fetal movement and NR NST. BP was in sever range and she was started on Magnesium sulfate and given procardia 10 mg IVP.   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 Tumor had been resected after 4 surgical procedures over 15 years ago. she had seen her neurologist in  prior to pregnancy to get clearance to do. She had an MRI of the brain. No tumors or masses were evident. There were surgical postop changes. She has not had any seizures since the tumor was resected. She was evaluated by neuro prior to C/S and was given clearance for spinal anesthesia.  She was taken to the OR. She was delivered by LFTCS of a viable baby girl via breech extraction-RSA.  Clear fluid was noted. Baby cried spontaneously. She was handed to the  team who were in attendance. Apgars                        weight: 1830 grams  Placenta was delivered spontaneously and intact with 3 vessel cord. Placenta was sent to pathology.   ml   ml  FI 2000 ml  Ancef 2 grams given  Betadine vaginal prep.

## 2023-08-22 NOTE — OB RN INTRAOPERATIVE NOTE - NS_INTERMITTENTPNEUMATICCOMPRESSIOUNITBIOMEDNUMBER_OBGYN_ALL_OB_FT
-- Reduce Lantus to 8 or 10 units night before surgery   -- May needs more correction day of based on sugar levels     -- Hold aspirin for 5-7 days before surgery, unless you have had a stent then continue aspirin.   -- Hold ibuprofen/Advil for 2-3 days before surgery   -- Hold naproxen/Aleve for 5-7 days before surgery   -- Acetaminophen (Tylenol) is okay   -- Hold vitamins and herbal remedies for 7 days before surgery    
[id number]

## 2023-08-22 NOTE — OB PROVIDER DELIVERY SUMMARY - NSPROCPERFORMEDA_OBGYN_ALL_OB
The patient location is: [place]  The chief complaint leading to consultation is: loud snoring, sleepiness    Visit type: audiovisual  (during the call we had to switch to telephone)    Face to Face time with patient: 20 minutes  30 minutes of total time spent on the encounter, which includes face to face time and non-face to face time preparing to see the patient (eg, review of tests), Obtaining and/or reviewing separately obtained history, Documenting clinical information in the electronic or other health record, Independently interpreting results (not separately reported) and communicating results to the patient/family/caregiver, or Care coordination (not separately reported).     Each patient to whom he or she provides medical services by telemedicine is:  (1) informed of the relationship between the physician and patient and the respective role of any other health care provider with respect to management of the patient; and (2) notified that he or she may decline to receive medical services by telemedicine and may withdraw from such care at any time.    NEW PATIENT VISIT  Referred by Self, Aaareferral     Travis Marie  is a pleasant 31 y.o. male with attention deficit disorder, hypertension who presents for evaluation of snoring and sleepiness.     Sleep has been observed by the patient's spouse who is a RN who has noted loud snoring    The patient ENDORSES  rare snoring arousals    +FHx: both parents with CPAP    SLEEP SCHEDULE:  Bed Time:  11p-MN  Environment:      Lights out:      Hypnotic:      Sleep Latency: Not long  Arousals:  1-2 times (some due to new baby)  Nocturia:  once  Back to sleep: Usually not long  Wake time:  5:15 A (work days)  Refreshed?  no   Dry mouth?  AM headache?    Weekend changes:   Naps:   Sometimes during lunch at work  Daytime sleepiness?yes  Sleepy driving? Occasional drowsiness       Work schedule:     ESS:   6/24    ROS:  The patient notes the following symptoms:  difficulty  breathing through the nose when laying down  The remainder of a complete ROS was performed and is negative except as noted above.  The patient's allergies and medications were reviewed.  The patient's past medical, family, and social history were reviewed.    Physical Exam:  GEN:   Well-appearing  SKIN:  No rash on the face or bridge of the nose  EYES:  No icterus, pupils equal  Psych:  Appropriate affect, demonstrates insight    RECORDS REVIEWED TODAY:    No results found for: HGB, HCO3  No prior sleep testing.    ASSESSMENT:    POSSIBLE CHARLOTTE: Comorbidities: HTN .  STOP BANG positive predictors:  Loud snoring, Tired during the day, HTN, BMI > 35, Neck circumference >16 inches, Male .  TOTAL = 6/8.    SLEEPINESS: does not meet criteria for hypersomnolence     NOCTURIA: underlying CHARLOTTE may be contributing    ASSOCIATED CONDITIONS:, hypertension, attention deficit disorder  ADD: on adderall 45 TID  PLAN:    -will start with home sleep testing (HST) given the patient's high pretest probability of CHARLOTTE. If home sleep testing is inconclusive, will need an in-lab diagnostic study to definitively exclude CHARLOTTE.   -we discussed trial of PAP therapy   -will address possible decrease in adderall in the future  -the patient was warned to never drive when sleepy    -will arrange RTC depending on results of sleep testing.    The patient was counseled on the pathophysiology of obstructive sleep apnea, cardiovascular risks of untreated CHARLOTTE and mask and machine desensitization    The patient was given open opportunity to ask questions and/or express concerns about treatment plan.   All questions/concerns were discussed.     Two patient identifiers used prior to evaluation.     Preferred method of contact: Portal       Low Segment Transverse C/S

## 2023-08-22 NOTE — OB PROVIDER DELIVERY SUMMARY - NS_SCHEDBEFORE39_OBGYN_ALL_OB
Hypertensive Disorder/Decreased Fetal Movements/BPP Abnormalities/FHR Abnormalities/Malpresentation/Preeclampsia

## 2023-08-22 NOTE — OB PROVIDER DELIVERY SUMMARY - NSSELHIDDEN_OBGYN_ALL_OB_FT
[NS_DeliveryAttending1_OBGYN_ALL_OB_FT:[id number]],[NS_DeliveryAssist1_OBGYN_ALL_OB_FT:[id number]],[NS_DeliveryRN_OBGYN_ALL_OB_FT:[id number]]

## 2023-08-22 NOTE — OB NEONATOLOGY/PEDIATRICIAN DELIVERY SUMMARY - NSPEDSNEONOTESA_OBGYN_ALL_OB_FT
Baby is a 34wk F born via  to a 37yo  A+ mother. Maternal history significant for benign brain tumor at the age of 16 for and recurrence in  for which she has had surgeries x4, anxiety and depression without medications. PCOS for which she takes Metformin. Prenatal hx is significant for PEC.  PNL nrl/immune/-, GBS neg in urine. ROM at delivery with clear fluid. Baby emerged crying and vigorous, was w/d/s/s, APGARS 8/9. Pt was started on CPAP 5 21% at about 3MOL due to poor color and low saturation. Mom would like to breast and bottle feed and she also consents to Hep B vaccine. Temperature before OR is 98.3  Pt was transferred to the NICU on CPAP after updating parents in the OR.

## 2023-08-23 LAB
ALBUMIN SERPL ELPH-MCNC: 3.3 G/DL — SIGNIFICANT CHANGE UP (ref 3.3–5)
ALP SERPL-CCNC: 216 U/L — HIGH (ref 40–120)
ALT FLD-CCNC: 17 U/L — SIGNIFICANT CHANGE UP (ref 10–45)
ANION GAP SERPL CALC-SCNC: 12 MMOL/L — SIGNIFICANT CHANGE UP (ref 5–17)
APTT BLD: 27.8 SEC — SIGNIFICANT CHANGE UP (ref 24.5–35.6)
AST SERPL-CCNC: 13 U/L — SIGNIFICANT CHANGE UP (ref 10–40)
BASOPHILS # BLD AUTO: 0.01 K/UL — SIGNIFICANT CHANGE UP (ref 0–0.2)
BASOPHILS NFR BLD AUTO: 0.1 % — SIGNIFICANT CHANGE UP (ref 0–2)
BILIRUB SERPL-MCNC: 0.1 MG/DL — LOW (ref 0.2–1.2)
BUN SERPL-MCNC: 13 MG/DL — SIGNIFICANT CHANGE UP (ref 7–23)
CALCIUM SERPL-MCNC: 7.5 MG/DL — LOW (ref 8.4–10.5)
CHLORIDE SERPL-SCNC: 103 MMOL/L — SIGNIFICANT CHANGE UP (ref 96–108)
CO2 SERPL-SCNC: 21 MMOL/L — LOW (ref 22–31)
CREAT SERPL-MCNC: 1.01 MG/DL — SIGNIFICANT CHANGE UP (ref 0.5–1.3)
EGFR: 74 ML/MIN/1.73M2 — SIGNIFICANT CHANGE UP
EOSINOPHIL # BLD AUTO: 0 K/UL — SIGNIFICANT CHANGE UP (ref 0–0.5)
EOSINOPHIL NFR BLD AUTO: 0 % — SIGNIFICANT CHANGE UP (ref 0–6)
FIBRINOGEN PPP-MCNC: 544 MG/DL — HIGH (ref 200–445)
GLUCOSE BLDC GLUCOMTR-MCNC: 127 MG/DL — HIGH (ref 70–99)
GLUCOSE SERPL-MCNC: 127 MG/DL — HIGH (ref 70–99)
HCT VFR BLD CALC: 38.2 % — SIGNIFICANT CHANGE UP (ref 34.5–45)
HGB BLD-MCNC: 13.1 G/DL — SIGNIFICANT CHANGE UP (ref 11.5–15.5)
IMM GRANULOCYTES NFR BLD AUTO: 0.7 % — SIGNIFICANT CHANGE UP (ref 0–0.9)
INR BLD: 0.83 RATIO — LOW (ref 0.85–1.18)
LDH SERPL L TO P-CCNC: 224 U/L — SIGNIFICANT CHANGE UP (ref 50–242)
LYMPHOCYTES # BLD AUTO: 1.53 K/UL — SIGNIFICANT CHANGE UP (ref 1–3.3)
LYMPHOCYTES # BLD AUTO: 7.7 % — LOW (ref 13–44)
MCHC RBC-ENTMCNC: 30.1 PG — SIGNIFICANT CHANGE UP (ref 27–34)
MCHC RBC-ENTMCNC: 34.3 GM/DL — SIGNIFICANT CHANGE UP (ref 32–36)
MCV RBC AUTO: 87.8 FL — SIGNIFICANT CHANGE UP (ref 80–100)
MONOCYTES # BLD AUTO: 1.2 K/UL — HIGH (ref 0–0.9)
MONOCYTES NFR BLD AUTO: 6 % — SIGNIFICANT CHANGE UP (ref 2–14)
NEUTROPHILS # BLD AUTO: 17.02 K/UL — HIGH (ref 1.8–7.4)
NEUTROPHILS NFR BLD AUTO: 85.5 % — HIGH (ref 43–77)
NRBC # BLD: 0 /100 WBCS — SIGNIFICANT CHANGE UP (ref 0–0)
PLATELET # BLD AUTO: 197 K/UL — SIGNIFICANT CHANGE UP (ref 150–400)
POTASSIUM SERPL-MCNC: 4.7 MMOL/L — SIGNIFICANT CHANGE UP (ref 3.5–5.3)
POTASSIUM SERPL-SCNC: 4.7 MMOL/L — SIGNIFICANT CHANGE UP (ref 3.5–5.3)
PROT SERPL-MCNC: 6.7 G/DL — SIGNIFICANT CHANGE UP (ref 6–8.3)
PROTHROM AB SERPL-ACNC: 8.8 SEC — LOW (ref 9.5–13)
RBC # BLD: 4.35 M/UL — SIGNIFICANT CHANGE UP (ref 3.8–5.2)
RBC # FLD: 12.9 % — SIGNIFICANT CHANGE UP (ref 10.3–14.5)
SODIUM SERPL-SCNC: 136 MMOL/L — SIGNIFICANT CHANGE UP (ref 135–145)
URATE SERPL-MCNC: 6.2 MG/DL — SIGNIFICANT CHANGE UP (ref 2.5–7)
WBC # BLD: 19.89 K/UL — HIGH (ref 3.8–10.5)
WBC # FLD AUTO: 19.89 K/UL — HIGH (ref 3.8–10.5)

## 2023-08-23 RX ORDER — CALCIUM CARBONATE 500(1250)
1 TABLET ORAL ONCE
Refills: 0 | Status: COMPLETED | OUTPATIENT
Start: 2023-08-23 | End: 2023-08-24

## 2023-08-23 RX ORDER — IBUPROFEN 200 MG
600 TABLET ORAL EVERY 6 HOURS
Refills: 0 | Status: DISCONTINUED | OUTPATIENT
Start: 2023-08-23 | End: 2023-08-25

## 2023-08-23 RX ORDER — OXYCODONE HYDROCHLORIDE 5 MG/1
5 TABLET ORAL
Refills: 0 | Status: DISCONTINUED | OUTPATIENT
Start: 2023-08-23 | End: 2023-08-25

## 2023-08-23 RX ADMIN — Medication 600 MILLIGRAM(S): at 21:58

## 2023-08-23 RX ADMIN — Medication 975 MILLIGRAM(S): at 01:00

## 2023-08-23 RX ADMIN — Medication 600 MILLIGRAM(S): at 14:24

## 2023-08-23 RX ADMIN — OXYCODONE HYDROCHLORIDE 5 MILLIGRAM(S): 5 TABLET ORAL at 16:30

## 2023-08-23 RX ADMIN — Medication 600 MILLIGRAM(S): at 08:50

## 2023-08-23 RX ADMIN — SIMETHICONE 80 MILLIGRAM(S): 80 TABLET, CHEWABLE ORAL at 21:29

## 2023-08-23 RX ADMIN — HEPARIN SODIUM 5000 UNIT(S): 5000 INJECTION INTRAVENOUS; SUBCUTANEOUS at 21:29

## 2023-08-23 RX ADMIN — SIMETHICONE 80 MILLIGRAM(S): 80 TABLET, CHEWABLE ORAL at 15:38

## 2023-08-23 RX ADMIN — Medication 600 MILLIGRAM(S): at 21:28

## 2023-08-23 RX ADMIN — Medication 975 MILLIGRAM(S): at 23:53

## 2023-08-23 RX ADMIN — Medication 975 MILLIGRAM(S): at 12:46

## 2023-08-23 RX ADMIN — Medication 600 MILLIGRAM(S): at 15:20

## 2023-08-23 RX ADMIN — Medication 30 MILLIGRAM(S): at 03:30

## 2023-08-23 RX ADMIN — Medication 600 MILLIGRAM(S): at 09:30

## 2023-08-23 RX ADMIN — Medication 975 MILLIGRAM(S): at 18:17

## 2023-08-23 RX ADMIN — HEPARIN SODIUM 5000 UNIT(S): 5000 INJECTION INTRAVENOUS; SUBCUTANEOUS at 08:50

## 2023-08-23 RX ADMIN — Medication 975 MILLIGRAM(S): at 13:30

## 2023-08-23 RX ADMIN — Medication 975 MILLIGRAM(S): at 05:23

## 2023-08-23 RX ADMIN — Medication 30 MILLIGRAM(S): at 23:03

## 2023-08-23 RX ADMIN — Medication 30 MILLIGRAM(S): at 02:44

## 2023-08-23 RX ADMIN — OXYCODONE HYDROCHLORIDE 5 MILLIGRAM(S): 5 TABLET ORAL at 15:38

## 2023-08-23 NOTE — PROVIDER CONTACT NOTE (CHANGE IN STATUS NOTIFICATION) - ASSESSMENT
Patient denies headache, visual changes, RUQ pain. Patient complaining of uterine cramping and incisional pain. Patient recently finished using electric breast pump.

## 2023-08-23 NOTE — PROGRESS NOTE ADULT - SUBJECTIVE AND OBJECTIVE BOX
Day 1 of Anesthesia Pain Management Service    SUBJECTIVE:  Pain Scale Score:          [X] Refer to charted pain scores    THERAPY:    s/p 100 mcg PF morphine on 8/22 0158      MEDICATIONS  (STANDING):  acetaminophen     Tablet .. 975 milliGRAM(s) Oral <User Schedule>  diphtheria/tetanus/pertussis (acellular) Vaccine (Adacel) 0.5 milliLiter(s) IntraMuscular once  heparin   Injectable 5000 Unit(s) SubCutaneous every 12 hours  ibuprofen  Tablet. 600 milliGRAM(s) Oral every 6 hours  lactated ringers. 1000 milliLiter(s) (125 mL/Hr) IV Continuous <Continuous>  NIFEdipine XL 30 milliGRAM(s) Oral every 24 hours  oxytocin Infusion 333.333 milliUNIT(s)/Min (1000 mL/Hr) IV Continuous <Continuous>  oxytocin Infusion 333.333 milliUNIT(s)/Min (1000 mL/Hr) IV Continuous <Continuous>    MEDICATIONS  (PRN):  diphenhydrAMINE 25 milliGRAM(s) Oral every 4 hours PRN Rash and/or Itching  diphenhydrAMINE 25 milliGRAM(s) Oral every 6 hours PRN Pruritus  lanolin Ointment 1 Application(s) Topical every 6 hours PRN Sore Nipples  magnesium hydroxide Suspension 30 milliLiter(s) Oral two times a day PRN Constipation  oxyCODONE    IR 5 milliGRAM(s) Oral every 3 hours PRN Moderate to Severe Pain (4-10)  oxyCODONE    IR 5 milliGRAM(s) Oral once PRN Moderate to Severe Pain (4-10)  simethicone 80 milliGRAM(s) Chew every 4 hours PRN Gas      OBJECTIVE:    Sedation:        	[X] Alert	[ ] Drowsy	[ ] Arousable      [ ] Asleep       [ ] Unresponsive    Side Effects:	[X] None	[ ] Nausea	[ ] Vomiting         [ ] Pruritus  		[ ] Weakness            [ ] Numbness	          [ ] Other:    Vital Signs Last 24 Hrs  T(C): 36.8 (23 Aug 2023 06:12), Max: 36.8 (22 Aug 2023 22:00)  T(F): 98.2 (23 Aug 2023 06:12), Max: 98.2 (22 Aug 2023 22:00)  HR: 90 (23 Aug 2023 06:12) (77 - 98)  BP: 135/84 (23 Aug 2023 06:12) (113/77 - 143/94)  BP(mean): --  RR: 18 (23 Aug 2023 06:12) (15 - 18)  SpO2: 98% (23 Aug 2023 06:12) (95% - 99%)    Parameters below as of 23 Aug 2023 06:12  Patient On (Oxygen Delivery Method): room air        ASSESSMENT/ PLAN  [X] Patient transitioned to prn analgesics  [X] Pain management per primary service, pain service to sign off   [X]Documentation and Verification of current medications

## 2023-08-23 NOTE — PROGRESS NOTE ADULT - SUBJECTIVE AND OBJECTIVE BOX
OB Progress Note:  Delivery, POD#1    S: 36yo POD#1 s/p pLTCS 2/2 sPEC with breech fetal presentation. Overnight patient complained of headache that resolved with tylenol, reglan, and benadryl. Her pain is well controlled. She is tolerating a regular diet and passing flatus. Denies N/V. Denies CP/SOB/lightheadedness/dizziness.   She is ambulating without difficulty.   Voiding spontaneously. On evaluation this AM patient denied HA, changes in vision, RUQ pain, epigastric pain    O:   Vital Signs Last 24 Hrs  T(C): 36.6 (22 Aug 2023 23:59), Max: 36.8 (22 Aug 2023 03:20)  T(F): 97.8 (22 Aug 2023 23:59), Max: 98.2 (22 Aug 2023 03:20)  HR: 98 (22 Aug 2023 23:59) (77 - 98)  BP: 113/77 (22 Aug 2023 23:59) (113/77 - 146/86)  BP(mean): 106 (22 Aug 2023 08:05) (94 - 110)  RR: 18 (22 Aug 2023 23:59) (11 - 20)  SpO2: 96% (22 Aug 2023 23:59) (95% - 99%)    Parameters below as of 22 Aug 2023 23:59  Patient On (Oxygen Delivery Method): room air        Labs:  Blood type: A Positive  Rubella IgG: RPR: Negative                          12.6   11.71<H> >-----------< 171    (  @ 23:05 )             36.3    23 @ 23:05      133<L>  |  104  |  12  ----------------------------<  131<H>  4.1   |  18<L>  |  1.00        Ca    9.3      21 Aug 2023 23:05  Mg     7.0<H>     08-  Mg     6.2<H>     08  Mg     4.9<H>     08-22    TPro  6.1  /  Alb  2.9<L>  /  TBili  0.2  /  DBili  x   /  AST  17  /  ALT  20  /  AlkPhos  220<H>  08-21-23 @ 23:05      PE:  General: NAD  Abdomen: Mildly distended, appropriately tender, incision c/d/i.  Extremities: No erythema, no pitting edema     OB Progress Note:  Delivery, POD#1    S: 38yo POD#1 s/p pLTCS 2/2 sPEC with breech fetal presentation. Overnight patient complained of headache that resolved with tylenol, reglan, and benadryl. Her pain is well controlled. She is tolerating a regular diet but is not yet passing flatus. Denies N/V. Denies CP/SOB/lightheadedness/dizziness.   She is ambulating without difficulty.   Voiding spontaneously. On evaluation this AM patient denied HA, changes in vision, RUQ pain, epigastric pain    O:   Vital Signs Last 24 Hrs  T(C): 36.6 (22 Aug 2023 23:59), Max: 36.8 (22 Aug 2023 03:20)  T(F): 97.8 (22 Aug 2023 23:59), Max: 98.2 (22 Aug 2023 03:20)  HR: 98 (22 Aug 2023 23:59) (77 - 98)  BP: 113/77 (22 Aug 2023 23:59) (113/77 - 146/86)  BP(mean): 106 (22 Aug 2023 08:05) (94 - 110)  RR: 18 (22 Aug 2023 23:59) (11 - 20)  SpO2: 96% (22 Aug 2023 23:59) (95% - 99%)    Parameters below as of 22 Aug 2023 23:59  Patient On (Oxygen Delivery Method): room air        Labs:  Blood type: A Positive  Rubella IgG: RPR: Negative                          12.6   11.71<H> >-----------< 171    (  @ 23:05 )             36.3    23 @ 23:05      133<L>  |  104  |  12  ----------------------------<  131<H>  4.1   |  18<L>  |  1.00        Ca    9.3      21 Aug 2023 23:05  Mg     7.0<H>     -  Mg     6.2<H>     08  Mg     4.9<H>     08-22    TPro  6.1  /  Alb  2.9<L>  /  TBili  0.2  /  DBili  x   /  AST  17  /  ALT  20  /  AlkPhos  220<H>  23 @ 23:05      PE:  General: NAD  Abdomen: Mildly distended, appropriately tender, incision c/d/i.  Extremities: No erythema, no pitting edema

## 2023-08-23 NOTE — PROGRESS NOTE ADULT - ASSESSMENT
A/P: 36yo POD#1 s/p pLTCS 2/2 breech fetal presentation in the setting of preeclampsia with severe features (BP).  Patient is stable and doing well post-operatively.      #sPEC  -s/p Mg for seizure ppx  -s/p Procardia 10 IR  -c/w Procardia 30xL  -AM HELLP labs  -BP well controlled over, continue to monitor    #postpartum  - Continue regular diet.  - Increase ambulation.  - Continue motrin, tylenol, oxycodone PRN for pain control.     ALFREDO Morales PGY3

## 2023-08-24 RX ORDER — NIFEDIPINE 30 MG
60 TABLET, EXTENDED RELEASE 24 HR ORAL DAILY
Refills: 0 | Status: DISCONTINUED | OUTPATIENT
Start: 2023-08-24 | End: 2023-08-25

## 2023-08-24 RX ADMIN — Medication 975 MILLIGRAM(S): at 09:34

## 2023-08-24 RX ADMIN — Medication 975 MILLIGRAM(S): at 20:13

## 2023-08-24 RX ADMIN — Medication 600 MILLIGRAM(S): at 13:29

## 2023-08-24 RX ADMIN — Medication 600 MILLIGRAM(S): at 12:59

## 2023-08-24 RX ADMIN — Medication 975 MILLIGRAM(S): at 16:21

## 2023-08-24 RX ADMIN — OXYCODONE HYDROCHLORIDE 5 MILLIGRAM(S): 5 TABLET ORAL at 23:37

## 2023-08-24 RX ADMIN — Medication 975 MILLIGRAM(S): at 00:23

## 2023-08-24 RX ADMIN — Medication 600 MILLIGRAM(S): at 18:27

## 2023-08-24 RX ADMIN — Medication 1 TABLET(S): at 00:24

## 2023-08-24 RX ADMIN — Medication 975 MILLIGRAM(S): at 15:44

## 2023-08-24 RX ADMIN — Medication 600 MILLIGRAM(S): at 06:36

## 2023-08-24 RX ADMIN — HEPARIN SODIUM 5000 UNIT(S): 5000 INJECTION INTRAVENOUS; SUBCUTANEOUS at 09:39

## 2023-08-24 RX ADMIN — Medication 600 MILLIGRAM(S): at 23:37

## 2023-08-24 RX ADMIN — Medication 975 MILLIGRAM(S): at 10:20

## 2023-08-24 RX ADMIN — HEPARIN SODIUM 5000 UNIT(S): 5000 INJECTION INTRAVENOUS; SUBCUTANEOUS at 20:14

## 2023-08-24 RX ADMIN — Medication 975 MILLIGRAM(S): at 21:00

## 2023-08-24 RX ADMIN — Medication 60 MILLIGRAM(S): at 09:36

## 2023-08-24 NOTE — PROGRESS NOTE ADULT - ASSESSMENT
A/P: 36yo POD#2 s/p pLTCS 2/2 breech fetal presentation in the setting of preeclampsia with severe features (BP).  Patient is stable and doing well post-operatively.      #sPEC  -s/p Mg for seizure ppx  -s/p Procardia 10 IR  -c/w Procardia 30xL  -BP well controlled over, continue to monitor    #postpartum  - Continue regular diet.  - Increase ambulation.  - Continue motrin, tylenol, oxycodone PRN for pain control.     ALFREDO Morales PGY3

## 2023-08-24 NOTE — PROGRESS NOTE ADULT - SUBJECTIVE AND OBJECTIVE BOX
Section/Postpartum    TANIA CARBALLO is a  37y woman   , who is now post-operative day:2       PAST MEDICAL & SURGICAL HISTORY:  Seizure  last seizure over 15 yrs ago.      Depression      Brain tumor  benign      S/P brain surgery          Subjective:  The patient feels well.  She is ambulating.   She is tolerating regular diet.  She denies nausea and vomiting.  She is voiding.  Her pain is controlled.  She reports normal postpartum bleeding.  She is breastfeeding.  She is formula feeding.    Physical exam:    Vital Signs Last 24 Hrs  T(C): 36.9 (24 Aug 2023 17:09), Max: 37 (24 Aug 2023 00:18)  T(F): 98.5 (24 Aug 2023 17:09), Max: 98.6 (24 Aug 2023 00:18)  HR: 90 (24 Aug 2023 17:09) (78 - 96)  BP: 123/78 (24 Aug 2023 17:09) (123/78 - 143/91)  BP(mean): --  RR: 18 (24 Aug 2023 17:09) (18 - 18)  SpO2: 98% (24 Aug 2023 17:09) (97% - 98%)    Parameters below as of 24 Aug 2023 13:25  Patient On (Oxygen Delivery Method): room air        General: alert and oriented in no acute distress.    LABS:                        13.1   19.89 )-----------( 197      ( 23 Aug 2023 07:15 )             38.2       Rubella status: Immune     Blood Type:  ABO RH Interpretation: A POS                    Allergies    No Known Allergies    Intolerances        MEDICATIONS  (STANDING):  acetaminophen     Tablet .. 975 milliGRAM(s) Oral <User Schedule>  diphtheria/tetanus/pertussis (acellular) Vaccine (Adacel) 0.5 milliLiter(s) IntraMuscular once  heparin   Injectable 5000 Unit(s) SubCutaneous every 12 hours  ibuprofen  Tablet. 600 milliGRAM(s) Oral every 6 hours  lactated ringers. 1000 milliLiter(s) (125 mL/Hr) IV Continuous <Continuous>  NIFEdipine XL 60 milliGRAM(s) Oral daily  oxytocin Infusion 333.333 milliUNIT(s)/Min (1000 mL/Hr) IV Continuous <Continuous>  oxytocin Infusion 333.333 milliUNIT(s)/Min (1000 mL/Hr) IV Continuous <Continuous>    MEDICATIONS  (PRN):  diphenhydrAMINE 25 milliGRAM(s) Oral every 4 hours PRN Rash and/or Itching  diphenhydrAMINE 25 milliGRAM(s) Oral every 6 hours PRN Pruritus  lanolin Ointment 1 Application(s) Topical every 6 hours PRN Sore Nipples  magnesium hydroxide Suspension 30 milliLiter(s) Oral two times a day PRN Constipation  oxyCODONE    IR 5 milliGRAM(s) Oral every 3 hours PRN Moderate to Severe Pain (4-10)  oxyCODONE    IR 5 milliGRAM(s) Oral once PRN Moderate to Severe Pain (4-10)  simethicone 80 milliGRAM(s) Chew every 4 hours PRN Gas        Assessment and Plan:    POD #  2  s/p  PLFTCS       Doing well.  Encourage ambulation, may shower, routine postop care.  esejohn is doing well in NICU

## 2023-08-24 NOTE — PROGRESS NOTE ADULT - SUBJECTIVE AND OBJECTIVE BOX
OB Progress Note: LTCS, POD#2    S: 36yo POD#2 s/p pLTCS 2/2 sPEC with breech fetal presentation. Her pain is well controlled. She is tolerating a regular diet and is passing flatus. Denies N/V. Denies CP/SOB/lightheadedness/dizziness.   She is ambulating without difficulty.   Voiding spontaneously. Denies HA, changes in vision, RUQ pain, epigastric pain    O:  Vitals:  Vital Signs Last 24 Hrs  T(C): 37 (24 Aug 2023 00:18), Max: 37 (23 Aug 2023 14:23)  T(F): 98.6 (24 Aug 2023 00:18), Max: 98.6 (23 Aug 2023 14:23)  HR: 78 (24 Aug 2023 00:18) (75 - 90)  BP: 134/81 (24 Aug 2023 00:18) (134/81 - 156/95)  BP(mean): --  RR: 18 (24 Aug 2023 00:18) (18 - 18)  SpO2: 98% (24 Aug 2023 00:18) (97% - 99%)    Parameters below as of 24 Aug 2023 00:18  Patient On (Oxygen Delivery Method): room air        MEDICATIONS  (STANDING):  acetaminophen     Tablet .. 975 milliGRAM(s) Oral <User Schedule>  diphtheria/tetanus/pertussis (acellular) Vaccine (Adacel) 0.5 milliLiter(s) IntraMuscular once  heparin   Injectable 5000 Unit(s) SubCutaneous every 12 hours  ibuprofen  Tablet. 600 milliGRAM(s) Oral every 6 hours  lactated ringers. 1000 milliLiter(s) (125 mL/Hr) IV Continuous <Continuous>  NIFEdipine XL 30 milliGRAM(s) Oral every 24 hours  oxytocin Infusion 333.333 milliUNIT(s)/Min (1000 mL/Hr) IV Continuous <Continuous>  oxytocin Infusion 333.333 milliUNIT(s)/Min (1000 mL/Hr) IV Continuous <Continuous>      MEDICATIONS  (PRN):  diphenhydrAMINE 25 milliGRAM(s) Oral every 6 hours PRN Pruritus  diphenhydrAMINE 25 milliGRAM(s) Oral every 4 hours PRN Rash and/or Itching  lanolin Ointment 1 Application(s) Topical every 6 hours PRN Sore Nipples  magnesium hydroxide Suspension 30 milliLiter(s) Oral two times a day PRN Constipation  oxyCODONE    IR 5 milliGRAM(s) Oral every 3 hours PRN Moderate to Severe Pain (4-10)  oxyCODONE    IR 5 milliGRAM(s) Oral once PRN Moderate to Severe Pain (4-10)  simethicone 80 milliGRAM(s) Chew every 4 hours PRN Gas      Labs:  Blood type: A Positive  Rubella IgG: RPR: Negative                          13.1   19.89<H> >-----------< 197    ( 08-23 @ 07:15 )             38.2                        12.6   11.71<H> >-----------< 171    ( 08-21 @ 23:05 )             36.3    08-23-23 @ 07:16      136  |  103  |  13  ----------------------------<  127<H>  4.7   |  21<L>  |  1.01    08-21-23 @ 23:05      133<L>  |  104  |  12  ----------------------------<  131<H>  4.1   |  18<L>  |  1.00        Ca    7.5<L>      23 Aug 2023 07:16  Ca    9.3      21 Aug 2023 23:05  Mg     7.0<H>     08-22  Mg     6.2<H>     08-22  Mg     4.9<H>     08-22    TPro  6.7  /  Alb  3.3  /  TBili  0.1<L>  /  DBili  x   /  AST  13  /  ALT  17  /  AlkPhos  216<H>  08-23-23 @ 07:16  TPro  6.1  /  Alb  2.9<L>  /  TBili  0.2  /  DBili  x   /  AST  17  /  ALT  20  /  AlkPhos  220<H>  08-21-23 @ 23:05          PE:  General: NAD  Abdomen: Soft, appropriately tender, incision c/d/i.  Extremities: No erythema, no pitting edema

## 2023-08-25 ENCOUNTER — TRANSCRIPTION ENCOUNTER (OUTPATIENT)
Age: 38
End: 2023-08-25

## 2023-08-25 VITALS
OXYGEN SATURATION: 96 % | HEART RATE: 106 BPM | RESPIRATION RATE: 18 BRPM | DIASTOLIC BLOOD PRESSURE: 82 MMHG | SYSTOLIC BLOOD PRESSURE: 130 MMHG | TEMPERATURE: 98 F

## 2023-08-25 DIAGNOSIS — O14.90 UNSPECIFIED PRE-ECLAMPSIA, UNSPECIFIED TRIMESTER: ICD-10-CM

## 2023-08-25 DIAGNOSIS — O28.8 OTHER ABNORMAL FINDINGS ON ANTENATAL SCREENING OF MOTHER: ICD-10-CM

## 2023-08-25 DIAGNOSIS — O13.3 GESTATIONAL [PREGNANCY-INDUCED] HYPERTENSION WITHOUT SIGNIFICANT PROTEINURIA, THIRD TRIMESTER: ICD-10-CM

## 2023-08-25 DIAGNOSIS — Z3A.34 34 WEEKS GESTATION OF PREGNANCY: ICD-10-CM

## 2023-08-25 PROCEDURE — 85025 COMPLETE CBC W/AUTO DIFF WBC: CPT

## 2023-08-25 PROCEDURE — 83735 ASSAY OF MAGNESIUM: CPT

## 2023-08-25 PROCEDURE — 82962 GLUCOSE BLOOD TEST: CPT

## 2023-08-25 PROCEDURE — 82570 ASSAY OF URINE CREATININE: CPT

## 2023-08-25 PROCEDURE — 59050 FETAL MONITOR W/REPORT: CPT

## 2023-08-25 PROCEDURE — 36415 COLL VENOUS BLD VENIPUNCTURE: CPT

## 2023-08-25 PROCEDURE — 86769 SARS-COV-2 COVID-19 ANTIBODY: CPT

## 2023-08-25 PROCEDURE — 86900 BLOOD TYPING SEROLOGIC ABO: CPT

## 2023-08-25 PROCEDURE — 81001 URINALYSIS AUTO W/SCOPE: CPT

## 2023-08-25 PROCEDURE — 59025 FETAL NON-STRESS TEST: CPT

## 2023-08-25 PROCEDURE — 85730 THROMBOPLASTIN TIME PARTIAL: CPT

## 2023-08-25 PROCEDURE — 83615 LACTATE (LD) (LDH) ENZYME: CPT

## 2023-08-25 PROCEDURE — 84156 ASSAY OF PROTEIN URINE: CPT

## 2023-08-25 PROCEDURE — 88307 TISSUE EXAM BY PATHOLOGIST: CPT

## 2023-08-25 PROCEDURE — 86850 RBC ANTIBODY SCREEN: CPT

## 2023-08-25 PROCEDURE — 85610 PROTHROMBIN TIME: CPT

## 2023-08-25 PROCEDURE — 86901 BLOOD TYPING SEROLOGIC RH(D): CPT

## 2023-08-25 PROCEDURE — 80053 COMPREHEN METABOLIC PANEL: CPT

## 2023-08-25 PROCEDURE — 84550 ASSAY OF BLOOD/URIC ACID: CPT

## 2023-08-25 PROCEDURE — 85384 FIBRINOGEN ACTIVITY: CPT

## 2023-08-25 PROCEDURE — 86780 TREPONEMA PALLIDUM: CPT

## 2023-08-25 RX ORDER — IBUPROFEN 200 MG
3 TABLET ORAL
Qty: 0 | Refills: 0 | DISCHARGE

## 2023-08-25 RX ORDER — MAGNESIUM HYDROXIDE 400 MG/1
30 TABLET, CHEWABLE ORAL
Qty: 0 | Refills: 0 | DISCHARGE
Start: 2023-08-25

## 2023-08-25 RX ORDER — ACETAMINOPHEN 500 MG
0 TABLET ORAL
Qty: 0 | Refills: 0 | DISCHARGE
Start: 2023-08-25

## 2023-08-25 RX ORDER — NIFEDIPINE 30 MG
1 TABLET, EXTENDED RELEASE 24 HR ORAL
Qty: 30 | Refills: 1
Start: 2023-08-25 | End: 2023-10-23

## 2023-08-25 RX ORDER — OXYCODONE HYDROCHLORIDE 5 MG/1
1 TABLET ORAL
Qty: 8 | Refills: 0
Start: 2023-08-25 | End: 2023-08-26

## 2023-08-25 RX ORDER — IBUPROFEN 200 MG
3 TABLET ORAL
Qty: 0 | Refills: 0 | DISCHARGE
Start: 2023-08-25

## 2023-08-25 RX ORDER — LANOLIN
1 OINTMENT (GRAM) TOPICAL
Qty: 0 | Refills: 0 | DISCHARGE
Start: 2023-08-25

## 2023-08-25 RX ORDER — ACETAMINOPHEN 500 MG
2 TABLET ORAL
Qty: 0 | Refills: 0 | DISCHARGE

## 2023-08-25 RX ADMIN — Medication 600 MILLIGRAM(S): at 00:37

## 2023-08-25 RX ADMIN — OXYCODONE HYDROCHLORIDE 5 MILLIGRAM(S): 5 TABLET ORAL at 00:37

## 2023-08-25 RX ADMIN — Medication 600 MILLIGRAM(S): at 18:50

## 2023-08-25 RX ADMIN — OXYCODONE HYDROCHLORIDE 5 MILLIGRAM(S): 5 TABLET ORAL at 05:08

## 2023-08-25 RX ADMIN — Medication 600 MILLIGRAM(S): at 12:42

## 2023-08-25 RX ADMIN — Medication 600 MILLIGRAM(S): at 18:19

## 2023-08-25 RX ADMIN — Medication 600 MILLIGRAM(S): at 05:08

## 2023-08-25 RX ADMIN — Medication 975 MILLIGRAM(S): at 08:45

## 2023-08-25 RX ADMIN — Medication 60 MILLIGRAM(S): at 08:44

## 2023-08-25 RX ADMIN — Medication 975 MILLIGRAM(S): at 15:50

## 2023-08-25 RX ADMIN — Medication 975 MILLIGRAM(S): at 14:31

## 2023-08-25 RX ADMIN — MAGNESIUM HYDROXIDE 30 MILLILITER(S): 400 TABLET, CHEWABLE ORAL at 05:08

## 2023-08-25 RX ADMIN — Medication 600 MILLIGRAM(S): at 00:00

## 2023-08-25 RX ADMIN — Medication 975 MILLIGRAM(S): at 09:28

## 2023-08-25 RX ADMIN — HEPARIN SODIUM 5000 UNIT(S): 5000 INJECTION INTRAVENOUS; SUBCUTANEOUS at 08:44

## 2023-08-25 RX ADMIN — Medication 600 MILLIGRAM(S): at 13:35

## 2023-08-25 NOTE — PROGRESS NOTE ADULT - ASSESSMENT
A/P: 36yo POD#3 s/p pLTCS 2/2 breech fetal presentation in the setting of preeclampsia with severe features (BP).  Patient is stable and doing well post-operatively.      #sPEC  -s/p Mg for seizure ppx  -s/p Procardia 10 IR  -c/w Procardia 30xL  -BP well controlled overnight, continue to monitor    #postpartum  - Continue regular diet.  - Increase ambulation.  - Continue motrin, tylenol, oxycodone PRN for pain control.   - Heparin 5000u BID for prophylaxis    Amie Pickard PGY2   A/P: 36yo POD#3 s/p pLTCS 2/2 breech fetal presentation in the setting of preeclampsia with severe features (BP).  Patient is stable and doing well post-operatively.      #sPEC  -s/p Mg for seizure ppx  -s/p Procardia 10 IR  -c/w Procardia 30xL  -BP well controlled overnight, continue to monitor    #Postpartum  - Continue regular diet.  - Increase ambulation.  - Continue motrin, tylenol, oxycodone PRN for pain control.   - Heparin 5000u BID for prophylaxis    Amie Pickard PGY2

## 2023-08-25 NOTE — DISCHARGE NOTE OB - CARE PLAN
Principal Discharge DX:	34 weeks gestation of pregnancy  Assessment and plan of treatment:	admit  Secondary Diagnosis:	Gestational HTN, third trimester  Secondary Diagnosis:	Hypertension in pregnancy, preeclampsia  Assessment and plan of treatment:	magnesium sulfate  Nefidpine  Secondary Diagnosis:	Non-reactive NST (non-stress test)  Assessment and plan of treatment:	delivery  Secondary Diagnosis:	Fetal presentation, breech  Assessment and plan of treatment:	delivery-c/s  Secondary Diagnosis:	At high risk for fetal compromise  Assessment and plan of treatment:	delivery   1

## 2023-08-25 NOTE — DISCHARGE NOTE OB - PATIENT PORTAL LINK FT
You can access the FollowMyHealth Patient Portal offered by [facility] by registering at the following website: [email or web address]. By joining [employer]’s FollowMyHealth portal, you will also be able to view your health information using other applications (apps) compatible with our system.

## 2023-08-25 NOTE — DISCHARGE NOTE OB - SECONDARY DIAGNOSIS.
Non-reactive NST (non-stress test) At high risk for fetal compromise Hypertension in pregnancy, preeclampsia Gestational HTN, third trimester Fetal presentation, breech

## 2023-08-25 NOTE — DISCHARGE NOTE OB - NS MD DC FALL RISK RISK
For information on Fall & Injury Prevention, visit: [email or web address] OR  [email or web address] OR  https://www.cdc.gov/steadi/patient.html

## 2023-08-25 NOTE — DISCHARGE NOTE OB - HOSPITAL COURSE
Pt is a 38y/o  at 34+1 admitted for sPEC as well as decreased FM. In triage patient was noted to have multiple severe range blood pressures requiring a push of procardia. Patient was noted to have a nonreactive NST. Patient complained of HA and decreased FM. Denied LOF, VB, Cx.  Prenatal cours c/b gHTN one month ago  EFW 3000 BPP 4/8 and NR NST. Fetus was a breech position.  Pt was admitted for treatment of preeclapsia and delivery musa C/S for breech. She was delivered of a viable female infant with apgars 8/8 and wt 1830 (4' 0")  Baby was admitted to NICU for Cpap and care of  infant.  Mother has done well postoperatively.    OBHx: SAB x1 s/p D+C  GynHx: Denies cysts, fibroids, abnromal paps, STIs  PMHx: pregestational, prediabetes  PSHx: SAB x1, Brain surgery x4  Med: Brain Tumor  All: NKDA

## 2023-08-25 NOTE — PROGRESS NOTE ADULT - SUBJECTIVE AND OBJECTIVE BOX
OB Progress Note:  Delivery, POD#3    S: Patient feels well. Pain is well controlled. She is tolerating a regular diet and passing flatus. She is voiding spontaneously and ambulating without difficulty. Endorses light vaginal bleeding, soaking < 1 pad/hour. Denies CP/SOB, lightheadedness/dizziness, N/V.    MEDICATIONS  (STANDING):  acetaminophen     Tablet .. 975 milliGRAM(s) Oral <User Schedule>  diphtheria/tetanus/pertussis (acellular) Vaccine (Adacel) 0.5 milliLiter(s) IntraMuscular once  heparin   Injectable 5000 Unit(s) SubCutaneous every 12 hours  ibuprofen  Tablet. 600 milliGRAM(s) Oral every 6 hours  lactated ringers. 1000 milliLiter(s) (125 mL/Hr) IV Continuous <Continuous>  NIFEdipine XL 60 milliGRAM(s) Oral daily  oxytocin Infusion 333.333 milliUNIT(s)/Min (1000 mL/Hr) IV Continuous <Continuous>  oxytocin Infusion 333.333 milliUNIT(s)/Min (1000 mL/Hr) IV Continuous <Continuous>      MEDICATIONS  (PRN):  diphenhydrAMINE 25 milliGRAM(s) Oral every 6 hours PRN Pruritus  diphenhydrAMINE 25 milliGRAM(s) Oral every 4 hours PRN Rash and/or Itching  lanolin Ointment 1 Application(s) Topical every 6 hours PRN Sore Nipples  magnesium hydroxide Suspension 30 milliLiter(s) Oral two times a day PRN Constipation  oxyCODONE    IR 5 milliGRAM(s) Oral once PRN Moderate to Severe Pain (4-10)  oxyCODONE    IR 5 milliGRAM(s) Oral every 3 hours PRN Moderate to Severe Pain (4-10)  simethicone 80 milliGRAM(s) Chew every 4 hours PRN Gas      O:  Vitals:  Vital Signs Last 24 Hrs  T(C): 36.8 (25 Aug 2023 00:06), Max: 37 (24 Aug 2023 13:25)  T(F): 98.3 (25 Aug 2023 00:06), Max: 98.6 (24 Aug 2023 13:25)  HR: 90 (25 Aug 2023 00:06) (83 - 96)  BP: 126/82 (25 Aug 2023 00:06) (123/78 - 143/91)  BP(mean): --  RR: 18 (25 Aug 2023 00:06) (18 - 18)  SpO2: 97% (25 Aug 2023 00:06) (97% - 98%)    Parameters below as of 25 Aug 2023 00:06  Patient On (Oxygen Delivery Method): room air        Labs:  Blood type: A Positive  Rubella IgG: RPR: Negative                          13.1   19.89<H> >-----------< 197    (  @ 07:15 )             38.2    23 @ 07:16      136  |  103  |  13  ----------------------------<  127<H>  4.7   |  21<L>  |  1.01        Ca    7.5<L>      23 Aug 2023 07:16  Mg     7.0<H>     08-22  Mg     6.2<H>     08-22  Mg     4.9<H>     08-22    TPro  6.7  /  Alb  3.3  /  TBili  0.1<L>  /  DBili  x   /  AST  13  /  ALT  17  /  AlkPhos  216<H>  23 @ 07:16          Physical Exam:  General: NAD  Heart: Clinically well-perfused  Lungs: Breathing comfortably on room air  Abdomen: Soft, non-distended, appropriately tender, fundus firm, incision c/d/i  Extremities: No calf edema/tenderness OB Progress Note:  Delivery, POD#3    S: Patient feels well. Pain is well controlled. She is tolerating a regular diet and passing flatus. She is voiding spontaneously and ambulating without difficulty. Endorses light vaginal bleeding, soaking < 1 pad/hour. Denies CP/SOB, lightheadedness/dizziness, N/V. Denies headache, visual changes, RUQ pain, worsening edema.    MEDICATIONS  (STANDING):  acetaminophen     Tablet .. 975 milliGRAM(s) Oral <User Schedule>  diphtheria/tetanus/pertussis (acellular) Vaccine (Adacel) 0.5 milliLiter(s) IntraMuscular once  heparin   Injectable 5000 Unit(s) SubCutaneous every 12 hours  ibuprofen  Tablet. 600 milliGRAM(s) Oral every 6 hours  lactated ringers. 1000 milliLiter(s) (125 mL/Hr) IV Continuous <Continuous>  NIFEdipine XL 60 milliGRAM(s) Oral daily  oxytocin Infusion 333.333 milliUNIT(s)/Min (1000 mL/Hr) IV Continuous <Continuous>  oxytocin Infusion 333.333 milliUNIT(s)/Min (1000 mL/Hr) IV Continuous <Continuous>      MEDICATIONS  (PRN):  diphenhydrAMINE 25 milliGRAM(s) Oral every 6 hours PRN Pruritus  diphenhydrAMINE 25 milliGRAM(s) Oral every 4 hours PRN Rash and/or Itching  lanolin Ointment 1 Application(s) Topical every 6 hours PRN Sore Nipples  magnesium hydroxide Suspension 30 milliLiter(s) Oral two times a day PRN Constipation  oxyCODONE    IR 5 milliGRAM(s) Oral once PRN Moderate to Severe Pain (4-10)  oxyCODONE    IR 5 milliGRAM(s) Oral every 3 hours PRN Moderate to Severe Pain (4-10)  simethicone 80 milliGRAM(s) Chew every 4 hours PRN Gas      O:  Vitals:  Vital Signs Last 24 Hrs  T(C): 36.8 (25 Aug 2023 00:06), Max: 37 (24 Aug 2023 13:25)  T(F): 98.3 (25 Aug 2023 00:06), Max: 98.6 (24 Aug 2023 13:25)  HR: 90 (25 Aug 2023 00:06) (83 - 96)  BP: 126/82 (25 Aug 2023 00:06) (123/78 - 143/91)  BP(mean): --  RR: 18 (25 Aug 2023 00:06) (18 - 18)  SpO2: 97% (25 Aug 2023 00:06) (97% - 98%)    Parameters below as of 25 Aug 2023 00:06  Patient On (Oxygen Delivery Method): room air        Labs:  Blood type: A Positive  Rubella IgG: RPR: Negative                          13.1   19.89<H> >-----------< 197    (  @ 07:15 )             38.2    23 @ 07:16      136  |  103  |  13  ----------------------------<  127<H>  4.7   |  21<L>  |  1.01        Ca    7.5<L>      23 Aug 2023 07:16  Mg     7.0<H>     08-22  Mg     6.2<H>     08-  Mg     4.9<H>     -22    TPro  6.7  /  Alb  3.3  /  TBili  0.1<L>  /  DBili  x   /  AST  13  /  ALT  17  /  AlkPhos  216<H>  23 @ 07:16          Physical Exam:  General: NAD  Heart: Clinically well-perfused  Lungs: Breathing comfortably on room air  Abdomen: Soft, non-distended, appropriately tender, fundus firm, incision c/d/i

## 2023-08-25 NOTE — PROGRESS NOTE ADULT - SUBJECTIVE AND OBJECTIVE BOX
Section/Postpartum    TANIA CARBALLO is a  37y woman   , who is now post-operative day 3.    PAST MEDICAL & SURGICAL HISTORY:  Seizure  last seizure over 15 yrs ago.      Depression      Brain tumor  benign      S/P brain surgery          Subjective:  The patient feels well.  She is ambulating without difficulty.  She is tolerating PO.  She is voiding.  She denies nausea and vomiting.  Her pain is controlled.  She reports normal postpartum bleeding  She is breastfeeding.  She is formula feeding.    Physical exam:    Vital Signs Last 24 Hrs  T(C): 36.8 (25 Aug 2023 18:15), Max: 36.9 (25 Aug 2023 05:23)  T(F): 98.2 (25 Aug 2023 18:15), Max: 98.4 (25 Aug 2023 05:23)  HR: 106 (25 Aug 2023 18:15) (81 - 106)  BP: 130/82 (25 Aug 2023 18:15) (120/77 - 157/94)  BP(mean): --  RR: 18 (25 Aug 2023 18:15) (18 - 18)  SpO2: 96% (25 Aug 2023 18:15) (96% - 99%)    Parameters below as of 25 Aug 2023 18:15  Patient On (Oxygen Delivery Method): room air        General Apperance: alert and oriented in no acute distress  Breast: Soft, nontender, non engorged  Abdomen: Soft, nontender, not distended,  Uterine fundus is firm and below the umbilicus, BS(+), Flatus(+), Bowel Movement (+)  Incision: Clean, dry, and intact   Pelvic: Normal lochia noted  Ext: No calf tenderness, No edema or cyanosis  Lochia: not excessive    LABS:    Blood Type:   Rubella status:      Allergies    No Known Allergies    Intolerances        MEDICATIONS  (STANDING):  acetaminophen     Tablet .. 975 milliGRAM(s) Oral <User Schedule>  diphtheria/tetanus/pertussis (acellular) Vaccine (Adacel) 0.5 milliLiter(s) IntraMuscular once  heparin   Injectable 5000 Unit(s) SubCutaneous every 12 hours  ibuprofen  Tablet. 600 milliGRAM(s) Oral every 6 hours  lactated ringers. 1000 milliLiter(s) (125 mL/Hr) IV Continuous <Continuous>  NIFEdipine XL 60 milliGRAM(s) Oral daily  oxytocin Infusion 333.333 milliUNIT(s)/Min (1000 mL/Hr) IV Continuous <Continuous>  oxytocin Infusion 333.333 milliUNIT(s)/Min (1000 mL/Hr) IV Continuous <Continuous>    MEDICATIONS  (PRN):  diphenhydrAMINE 25 milliGRAM(s) Oral every 4 hours PRN Rash and/or Itching  diphenhydrAMINE 25 milliGRAM(s) Oral every 6 hours PRN Pruritus  lanolin Ointment 1 Application(s) Topical every 6 hours PRN Sore Nipples  magnesium hydroxide Suspension 30 milliLiter(s) Oral two times a day PRN Constipation  oxyCODONE    IR 5 milliGRAM(s) Oral once PRN Moderate to Severe Pain (4-10)  oxyCODONE    IR 5 milliGRAM(s) Oral every 3 hours PRN Moderate to Severe Pain (4-10)  simethicone 80 milliGRAM(s) Chew every 4 hours PRN Gas        Assessment and Plan:    POD # 3  S/P PLFTCS         Doing well.  Encouraged to ambulate.  Drink plenty of water and fluids.  Discharge home today.  Paient to take ibuprofen 600mg every 6 hours and/or Tylenol 2 (325mg tablet) tablets every 6 hoursfor pain  Follow up in 1-2 week(s) for incision check, TANIA CARBALLO is to call the office for an appointment.  To use abdominal binder while awake as needed  Verbal discharge instructions d/w patient  - discharge summary to be given to her on discharge for the hospital.  Call for fevers, chills, nausea, vomiting, heavy vaginal bleeding, vaginal discharge, severe pain, symptoms of depression, problems with incision or any other concerning symptoms.  Nothing in vagina and no heavy lifting for  6-8 weeks. No sex!  No driving x 2 weeks, Do not drive if you are taking narcotics.  Patient to continue with prenatal vitamins.

## 2023-09-19 LAB — SURGICAL PATHOLOGY STUDY: SIGNIFICANT CHANGE UP

## 2023-12-27 NOTE — PROGRESS NOTE ADULT - PROBLEM SELECTOR PLAN 4
PLFT[facility] PROVIDER:[TOKEN:[[id number]],FOLLOWUP:[2 weeks]] PROVIDER:[TOKEN:[[id number]],FOLLOWUP:[2 weeks]]

## 2024-02-13 NOTE — ED PROVIDER NOTE - PROGRESS NOTE DETAILS
ultrasound results reviewed lab work , discussed with patient prefer to follow up with obgyn tomorrow in the office, copy of results printed for pt, pt expalined dc instructions  return precautions explained to patient Patient has remained stable during her examination without pain, bleeding, or any physical discomfort. Patient's out-patient paperwork reviewed. Patient with bhcg of approx 360 on May 6th. Given labs and US we do not definitive see the pregnancy but we are able to see both ovaries and adnexal spaces. It was explained to patient and her  that if there is limited change in BHCG or no IUP then she will need to be urgently treated with medication for an ectopic pregnancy. We discussed having OB/gyn team in the hospital see that patient where there will two possible out-comes; close follow-up with her current ob/gyn 1-2 days vs. active treatment for ectopic pregnancy. Patient states if treatment is recommended not ready to make the decision and wants to talk to her own ob/gyn first. Copy of labs and US provided. Patient states she will speak with her doctor later today decide and how to move forward. None Known

## 2024-05-11 ENCOUNTER — OUTPATIENT (OUTPATIENT)
Dept: OUTPATIENT SERVICES | Facility: HOSPITAL | Age: 39
LOS: 1 days | End: 2024-05-11
Payer: COMMERCIAL

## 2024-05-11 ENCOUNTER — APPOINTMENT (OUTPATIENT)
Dept: MAMMOGRAPHY | Facility: CLINIC | Age: 39
End: 2024-05-11
Payer: COMMERCIAL

## 2024-05-11 ENCOUNTER — APPOINTMENT (OUTPATIENT)
Dept: ULTRASOUND IMAGING | Facility: CLINIC | Age: 39
End: 2024-05-11
Payer: COMMERCIAL

## 2024-05-11 DIAGNOSIS — Z00.8 ENCOUNTER FOR OTHER GENERAL EXAMINATION: ICD-10-CM

## 2024-05-11 DIAGNOSIS — Z98.89 OTHER SPECIFIED POSTPROCEDURAL STATES: Chronic | ICD-10-CM

## 2024-05-11 PROCEDURE — G0279: CPT

## 2024-05-11 PROCEDURE — 76642 ULTRASOUND BREAST LIMITED: CPT | Mod: 26,RT

## 2024-05-11 PROCEDURE — G0279: CPT | Mod: 26

## 2024-05-11 PROCEDURE — 77066 DX MAMMO INCL CAD BI: CPT | Mod: 26

## 2024-05-11 PROCEDURE — 77066 DX MAMMO INCL CAD BI: CPT

## 2024-05-11 PROCEDURE — 76642 ULTRASOUND BREAST LIMITED: CPT

## 2024-06-08 ENCOUNTER — TRANSCRIPTION ENCOUNTER (OUTPATIENT)
Age: 39
End: 2024-06-08

## (undated) DEVICE — PACK LITHOTOMY

## (undated) DEVICE — DRAPE MAYO STAND 23"

## (undated) DEVICE — MYOSURE SCOPE SEAL

## (undated) DEVICE — FLUENT FMS PROCEDURE KIT

## (undated) DEVICE — VENODYNE/SCD SLEEVE CALF LARGE

## (undated) DEVICE — GLV 6 PROTEXIS (WHITE)

## (undated) DEVICE — MYOSURE TISSUE REMOVAL DEVICE

## (undated) DEVICE — WARMING BLANKET UPPER ADULT

## (undated) DEVICE — DRAPE TOWEL BLUE 17" X 24"

## (undated) DEVICE — VENODYNE/SCD SLEEVE CALF MEDIUM

## (undated) DEVICE — SOL IRR BAG NS 0.9% 3000ML

## (undated) DEVICE — PLV-SCD MACHINE: Type: DURABLE MEDICAL EQUIPMENT

## (undated) DEVICE — DRAPE LIGHT HANDLE COVER (GREEN)